# Patient Record
Sex: MALE | Race: WHITE | NOT HISPANIC OR LATINO | Employment: PART TIME | URBAN - METROPOLITAN AREA
[De-identification: names, ages, dates, MRNs, and addresses within clinical notes are randomized per-mention and may not be internally consistent; named-entity substitution may affect disease eponyms.]

---

## 2017-01-19 ENCOUNTER — COMMUNICATION - HEALTHEAST (OUTPATIENT)
Dept: NEUROLOGY | Facility: CLINIC | Age: 22
End: 2017-01-19

## 2017-01-19 DIAGNOSIS — F39 UNSPECIFIED EPISODIC MOOD DISORDER: ICD-10-CM

## 2017-02-27 ENCOUNTER — OFFICE VISIT (OUTPATIENT)
Dept: FAMILY MEDICINE | Facility: CLINIC | Age: 22
End: 2017-02-27
Payer: COMMERCIAL

## 2017-02-27 VITALS
RESPIRATION RATE: 15 BRPM | SYSTOLIC BLOOD PRESSURE: 114 MMHG | TEMPERATURE: 98.2 F | DIASTOLIC BLOOD PRESSURE: 70 MMHG | WEIGHT: 242 LBS | BODY MASS INDEX: 31.07 KG/M2 | HEART RATE: 88 BPM | OXYGEN SATURATION: 91 %

## 2017-02-27 DIAGNOSIS — H66.004 RECURRENT ACUTE SUPPURATIVE OTITIS MEDIA OF RIGHT EAR WITHOUT SPONTANEOUS RUPTURE OF TYMPANIC MEMBRANE: Primary | ICD-10-CM

## 2017-02-27 PROCEDURE — 99213 OFFICE O/P EST LOW 20 MIN: CPT | Performed by: PHYSICIAN ASSISTANT

## 2017-02-27 RX ORDER — OFLOXACIN 3 MG/ML
10 SOLUTION AURICULAR (OTIC) 2 TIMES DAILY
Qty: 10 ML | Refills: 0 | Status: SHIPPED | OUTPATIENT
Start: 2017-02-27 | End: 2017-03-06

## 2017-02-27 NOTE — PROGRESS NOTES
SUBJECTIVE:                                                    Hamilton Hastings is a 21 year old male who presents to clinic today for the following health issues:    Ear infection      Duration: 3 days    Description (location/character/radiation): patient is here today for pain in his right ear, with drainage, no fevers, no other cold sxs    Intensity:  moderate    Accompanying signs and symptoms: see above    History (similar episodes/previous evaluation): None    Precipitating or alleviating factors: None    Therapies tried and outcome: None       HPI additional notes:   Chief Complaint   Patient presents with     Ear Problem     Hamilton presents today with his mother. Patient c/o Rt ear pain x2d. Wears ear buds and ear plugs often; has hx recurrent AOE due to this. Also has hx recurrent AOM, last treated Aug 2016.      ROS:  Skin: negative  Eyes: negative  Ears/Nose/Throat: as above  Respiratory: No shortness of breath, dyspnea on exertion, cough, or hemoptysis  Cardiovascular: negative  Gastrointestinal: negative  Genitourinary: negative  Musculoskeletal: negative  Neurologic: negative  Psychiatric: negative  Hematologic/Lymphatic/Immunologic: negative  Endocrine: negative    Chart Review:  History   Smoking Status     Never Smoker   Smokeless Tobacco     Never Used       Patient Active Problem List   Diagnosis     Autism     Traumatic brain injury (H)     History reviewed. No pertinent past surgical history.  Problem list, Medication list, Allergies, Medical/Social/Surg hx reviewed in [facility], updated as appropriate.   OBJECTIVE:                                                    /70 (BP Location: Left arm, Patient Position: Chair, Cuff Size: Adult Large)  Pulse 88  Temp 98.2  F (36.8  C) (Tympanic)  Resp 15  Wt 242 lb (109.8 kg)  SpO2 91%  BMI 31.07 kg/m2  Body mass index is 31.07 kg/(m^2).  GENERAL: healthy, in no acute distress  EYES: Grossly normal to inspection, no discharge, no injection  HENT: Rt  ear canal edematous, erythematous, yellow discharge; partially visualized TM, appears clear. Lt Ear canal normal; TMs pearly gray without effusion. Oral mucous membranes moist, no lesions or ulcerations. Pharynx pink.  Uvula midline.  NECK: Non-tender, no adenopathy.  RESP: lungs clear to auscultation - no rales, no rhonchi, no wheezes  CV: regular rate and rhythm, normal S1 S2.  No peripheral edema.  SKIN: no suspicious lesions, no rashes  PSYCH: alert, flat affect    Diagnostic test results: none     ASSESSMENT/PLAN:                                                          ICD-10-CM    1. Recurrent acute suppurative otitis media of right ear without spontaneous rupture of tympanic membrane H66.004 ofloxacin (FLOXIN) 0.3 % otic solution     Complete entire course of ear drops as directed, even if sx improve. Do not insert anything into the ear until treatment complete. Avoid submerging ear under water until treatment complete.     Please see patient instructions for treatment details.    Follow up in 7-10 days if not improving as anticipated, sooner PRN.    Melonie Johnson PA-C  [facility]

## 2017-02-27 NOTE — NURSING NOTE
"Chief Complaint   Patient presents with     Ear Problem       Initial /70 (BP Location: Left arm, Patient Position: Chair, Cuff Size: Adult Large)  Pulse 88  Temp 98.2  F (36.8  C) (Tympanic)  Resp 15  Wt 242 lb (109.8 kg)  SpO2 91%  BMI 31.07 kg/m2 Estimated body mass index is 31.07 kg/(m^2) as calculated from the following:    Height as of 8/30/16: 6' 2\" (1.88 m).    Weight as of this encounter: 242 lb (109.8 kg).  Medication Reconciliation: complete    "

## 2017-02-27 NOTE — MR AVS SNAPSHOT
"              After Visit Summary   2017    Hamilton Hastings    MRN: [id number]           Patient Information     Date Of Birth          1995        Visit Information        Provider Department      2017 2:50 PM Melonie Johnson PA-C [facility]        Today's Diagnoses     Recurrent acute suppurative otitis media of right ear without spontaneous rupture of tympanic membrane    -  1       Follow-ups after your visit        Who to contact     If you have questions or need follow up information about today's clinic visit or your schedule please contact [facility] directly at [phone].  Normal or non-critical lab and imaging results will be communicated to you by MyChart, letter or phone within 4 business days after the clinic has received the results. If you do not hear from us within 7 days, please contact the clinic through MyChart or phone. If you have a critical or abnormal lab result, we will notify you by phone as soon as possible.  Submit refill requests through [employer] or call your pharmacy and they will forward the refill request to us. Please allow 3 business days for your refill to be completed.          Additional Information About Your Visit        MyChart Information     [employer] lets you send messages to your doctor, view your test results, renew your prescriptions, schedule appointments and more. To sign up, go to [email or web address] . Click on \"Log in\" on the left side of the screen, which will take you to the Welcome page. Then click on \"Sign up Now\" on the right side of the page.     You will be asked to enter the access code listed below, as well as some personal information. Please follow the directions to create your username and password.     Your access code is: [email or web address]  Expires: 2017  3:02 PM     Your access code will  in 90 days. If you need help or a new code, please call your [place] clinic " or [phone].        Care EveryWhere ID     This is your Care EveryWhere ID. This could be used by other organizations to access your [facility] medical records  [id number]        Your Vitals Were     Pulse Temperature Respirations Pulse Oximetry BMI (Body Mass Index)       88 98.2  F (36.8  C) (Tympanic) 15 91% 31.07 kg/m2        Blood Pressure from Last 3 Encounters:   02/27/17 114/70   08/30/16 100/70   01/11/16 93/58    Weight from Last 3 Encounters:   02/27/17 242 lb (109.8 kg)   08/30/16 248 lb (112.5 kg)   01/11/16 248 lb 14.4 oz (112.9 kg)              Today, you had the following     No orders found for display         Today's Medication Changes          These changes are accurate as of: 2/27/17  3:02 PM.  If you have any questions, ask your nurse or doctor.               Start taking these medicines.        Dose/Directions    ofloxacin 0.3 % otic solution   Commonly known as:  FLOXIN   Used for:  Recurrent acute suppurative otitis media of right ear without spontaneous rupture of tympanic membrane   Started by:  Melonie Johnson PA-C        Dose:  10 drop   Place 10 drops into the right ear 2 times daily for 7 days   Quantity:  10 mL   Refills:  0            Where to get your medicines      These medications were sent to [facility] [address] [place] [address] TRACIE SMITH AT [facility] [address]IM[address]ODGES  [facility] [place] [address]     Phone:  [phone]     ofloxacin 0.3 % otic solution                Primary Care Provider Office Phone # Fax #    Tarik Ortega [phone]       [facility] [address] [facility] [address]        Thank you!     Thank you for choosing [facility]  for your care. Our goal is always to provide you with excellent care. Hearing back from our patients is one way we can continue to improve our services. Please take a few minutes to complete the written survey that you may receive in the  mail after your visit with us. Thank you!             Your Updated Medication List - Protect others around you: Learn how to safely use, store and throw away your medicines at www.disposemymeds.org.          This list is accurate as of: 2/27/17  3:02 PM.  Always use your most recent med list.                   Brand Name Dispense Instructions for use    ABILIFY 20 MG tablet   Generic drug:  ARIPiprazole      Take 0.5 tablets (10 mg) by mouth 2 times daily       ALPHA LIPOIC ACID PO          buPROPion 100 MG 12 hr tablet    WELLBUTRIN SR    60 tablet    Take 1 tablet (100 mg) by mouth daily       cetirizine 10 MG tablet    zyrTEC     Take 10 mg by mouth daily Reported on 2/27/2017       FISH OIL PO          FLUoxetine 10 MG capsule    PROzac    90 capsule    Take 2 capsules (20 mg) by mouth 2 times daily       magnesium 250 MG tablet     30 tablet    Take 1 tablet by mouth daily       MELATONIN PO      Take 3 mg by mouth       multivitamin, therapeutic Tabs tablet      Take 1 tablet by mouth daily Reported on 2/27/2017       ofloxacin 0.3 % otic solution    FLOXIN    10 mL    Place 10 drops into the right ear 2 times daily for 7 days

## 2017-03-07 ENCOUNTER — HOSPITAL ENCOUNTER (OUTPATIENT)
Dept: NEUROLOGY | Facility: CLINIC | Age: 22
Setting detail: THERAPIES SERIES
Discharge: STILL ADMITTED OR ADMINISTRATIVE | End: 2017-03-07
Attending: NURSE PRACTITIONER

## 2017-03-07 DIAGNOSIS — F32.A DEPRESSION: ICD-10-CM

## 2017-03-09 ENCOUNTER — OFFICE VISIT (OUTPATIENT)
Dept: FAMILY MEDICINE | Facility: CLINIC | Age: 22
End: 2017-03-09
Payer: COMMERCIAL

## 2017-03-09 VITALS
OXYGEN SATURATION: 98 % | HEIGHT: 74 IN | DIASTOLIC BLOOD PRESSURE: 78 MMHG | HEART RATE: 74 BPM | BODY MASS INDEX: 30.93 KG/M2 | SYSTOLIC BLOOD PRESSURE: 118 MMHG | TEMPERATURE: 97.7 F | RESPIRATION RATE: 22 BRPM | WEIGHT: 241 LBS

## 2017-03-09 DIAGNOSIS — S06.9X0D TRAUMATIC BRAIN INJURY, WITHOUT LOSS OF CONSCIOUSNESS, SUBSEQUENT ENCOUNTER: ICD-10-CM

## 2017-03-09 DIAGNOSIS — F84.0 AUTISM: ICD-10-CM

## 2017-03-09 DIAGNOSIS — Z11.1 SCREENING EXAMINATION FOR PULMONARY TUBERCULOSIS: ICD-10-CM

## 2017-03-09 DIAGNOSIS — Z00.00 ROUTINE GENERAL MEDICAL EXAMINATION AT A HEALTH CARE FACILITY: Primary | ICD-10-CM

## 2017-03-09 LAB
ALBUMIN UR-MCNC: NEGATIVE MG/DL
APPEARANCE UR: CLEAR
BASOPHILS # BLD AUTO: 0.1 10E9/L (ref 0–0.2)
BASOPHILS NFR BLD AUTO: 0.6 %
BILIRUB UR QL STRIP: NEGATIVE
COLOR UR AUTO: YELLOW
DIFFERENTIAL METHOD BLD: NORMAL
EOSINOPHIL # BLD AUTO: 0.1 10E9/L (ref 0–0.7)
EOSINOPHIL NFR BLD AUTO: 1.8 %
ERYTHROCYTE [DISTWIDTH] IN BLOOD BY AUTOMATED COUNT: 12.7 % (ref 10–15)
GLUCOSE UR STRIP-MCNC: NEGATIVE MG/DL
HCT VFR BLD AUTO: 46.5 % (ref 40–53)
HGB BLD-MCNC: 15.9 G/DL (ref 13.3–17.7)
HGB UR QL STRIP: NEGATIVE
KETONES UR STRIP-MCNC: NEGATIVE MG/DL
LEUKOCYTE ESTERASE UR QL STRIP: NEGATIVE
LYMPHOCYTES # BLD AUTO: 2.8 10E9/L (ref 0.8–5.3)
LYMPHOCYTES NFR BLD AUTO: 35.9 %
MCH RBC QN AUTO: 30.3 PG (ref 26.5–33)
MCHC RBC AUTO-ENTMCNC: 34.2 G/DL (ref 31.5–36.5)
MCV RBC AUTO: 89 FL (ref 78–100)
MONOCYTES # BLD AUTO: 0.6 10E9/L (ref 0–1.3)
MONOCYTES NFR BLD AUTO: 7.5 %
NEUTROPHILS # BLD AUTO: 4.2 10E9/L (ref 1.6–8.3)
NEUTROPHILS NFR BLD AUTO: 54.2 %
NITRATE UR QL: NEGATIVE
PH UR STRIP: 7 PH (ref 5–7)
PLATELET # BLD AUTO: 217 10E9/L (ref 150–450)
RBC # BLD AUTO: 5.24 10E12/L (ref 4.4–5.9)
SP GR UR STRIP: 1.02 (ref 1–1.03)
URN SPEC COLLECT METH UR: NORMAL
UROBILINOGEN UR STRIP-ACNC: 0.2 EU/DL (ref 0.2–1)
WBC # BLD AUTO: 7.8 10E9/L (ref 4–11)

## 2017-03-09 PROCEDURE — 81003 URINALYSIS AUTO W/O SCOPE: CPT | Performed by: FAMILY MEDICINE

## 2017-03-09 PROCEDURE — 99395 PREV VISIT EST AGE 18-39: CPT | Performed by: FAMILY MEDICINE

## 2017-03-09 PROCEDURE — 85025 COMPLETE CBC W/AUTO DIFF WBC: CPT | Performed by: FAMILY MEDICINE

## 2017-03-09 PROCEDURE — 36415 COLL VENOUS BLD VENIPUNCTURE: CPT | Performed by: FAMILY MEDICINE

## 2017-03-09 PROCEDURE — 86580 TB INTRADERMAL TEST: CPT | Performed by: FAMILY MEDICINE

## 2017-03-09 NOTE — LETTER
[facility]  [address]  [address]  [place] [place] [address]  [phone]                                                                                                           Hamilton Hastings  [address] [place] [address]    March 10, 2017      Dear Hamilton,    The results of your recent tests were reviewed and are enclosed.     Results for orders placed or performed in visit on 03/09/17   UA reflex to Microscopic and Culture   Result Value Ref Range    Color Urine Yellow     Appearance Urine Clear     Glucose Urine Negative NEG mg/dL    Bilirubin Urine Negative NEG    Ketones Urine Negative NEG mg/dL    Specific Gravity Urine 1.020 1.003 - 1.035    Blood Urine Negative NEG    pH Urine 7.0 5.0 - 7.0 pH    Protein Albumin Urine Negative NEG mg/dL    Urobilinogen Urine 0.2 0.2 - 1.0 EU/dL    Nitrite Urine Negative NEG    Leukocyte Esterase Urine Negative NEG    Source Midstream Urine    CBC with platelets differential   Result Value Ref Range    WBC 7.8 4.0 - 11.0 10e9/L    RBC Count 5.24 4.4 - 5.9 10e12/L    Hemoglobin 15.9 13.3 - 17.7 g/dL    Hematocrit 46.5 40.0 - 53.0 %    MCV 89 78 - 100 fl    MCH 30.3 26.5 - 33.0 pg    MCHC 34.2 31.5 - 36.5 g/dL    RDW 12.7 10.0 - 15.0 %    Platelet Count 217 150 - 450 10e9/L    Diff Method Automated Method     % Neutrophils 54.2 %    % Lymphocytes 35.9 %    % Monocytes 7.5 %    % Eosinophils 1.8 %    % Basophils 0.6 %    Absolute Neutrophil 4.2 1.6 - 8.3 10e9/L    Absolute Lymphocytes 2.8 0.8 - 5.3 10e9/L    Absolute Monocytes 0.6 0.0 - 1.3 10e9/L    Absolute Eosinophils 0.1 0.0 - 0.7 10e9/L    Absolute Basophils 0.1 0.0 - 0.2 10e9/L         Thank you for choosing [facility].  We appreciate the opportunity to serve you and look forward to supporting your healthcare needs in the future.    If you have any questions or concerns, please call me or my staff at [phone].      Sincerely,    Ayesha  MD Susan

## 2017-03-09 NOTE — PROGRESS NOTES
SUBJECTIVE:     CC: Hamilton Hastings is an 21 year old male who presents for preventative health visit.     Healthy Habits:    Do you get at least three servings of calcium containing foods daily (dairy, green leafy vegetables, etc.)? yes    Amount of exercise or daily activities, outside of work: 3 day(s) per week    Problems taking medications regularly No    Medication side effects: No    Have you had an eye exam in the past two years? yes    Do you see a dentist twice per year? yes  Do you have sleep apnea, excessive snoring or daytime drowsiness?no        Today's PHQ-2 Score:   PHQ-2 ( 1999 [employer]) 1/11/2016 10/16/2015   Q1: Little interest or pleasure in doing things 0 0   Q2: Feeling down, depressed or hopeless 0 0   PHQ-2 Score 0 0       Abuse: Current or Past(Physical, Sexual or Emotional)- No  Do you feel safe in your environment - Yes    Social History   Substance Use Topics     Smoking status: Never Smoker     Smokeless tobacco: Never Used     Alcohol use No     The patient does not drink >3 drinks per day nor >7 drinks per week.    Last PSA: No results found for: PSA    No results for input(s): CHOL, HDL, LDL, TRIG, CHOLHDLRATIO, NHDL in the last 90455 hours.    Reviewed orders with patient. Reviewed health maintenance and updated orders accordingly - Yes    Reviewed and updated as needed this visit by clinical staff  Allergies  Meds         Reviewed and updated as needed this visit by Provider            ROS:  C: NEGATIVE for fever, chills, change in weight  I: NEGATIVE for worrisome rashes, moles or lesions  E: NEGATIVE for vision changes or irritation  ENT: NEGATIVE for ear, mouth and throat problems  R: NEGATIVE for significant cough or SOB  CV: NEGATIVE for chest pain, palpitations or peripheral edema  GI: NEGATIVE for nausea, abdominal pain, heartburn, or change in bowel habits   male: negative for dysuria, hematuria, decreased urinary stream, erectile dysfunction, urethral discharge  M:  NEGATIVE for significant arthralgias or myalgia  N: NEGATIVE for weakness, dizziness or paresthesias  PSYCHIATRIC: POSITIVE for, agitation, anxiety, concentration difficulty, depressed mood, HX depression, impaired memory, obsessive thoughts, psychomotor agitation and weight gain    Problem list, Medication list, Allergies, and Medical/Social/Surgical histories reviewed in [facility] and updated as appropriate.  Labs reviewed in EPIC  OBJECTIVE:     There were no vitals taken for this visit.  EXAM:  GENERAL: healthy, alert and no distress  EYES: Eyes grossly normal to inspection, PERRL and conjunctivae and sclerae normal  HENT: normal cephalic/atraumatic, right ear: thickened TM, left ear: normal: no effusions, no erythema, normal landmarks, nose and mouth without ulcers or lesions, oropharynx clear and oral mucous membranes moist  NECK: no adenopathy, no asymmetry, masses, or scars and thyroid normal to palpation  RESP: lungs clear to auscultation - no rales, rhonchi or wheezes  BREAST: normal without masses, tenderness or nipple discharge and no palpable axillary masses or adenopathy  CV: regular rate and rhythm, normal S1 S2, no S3 or S4, no murmur, click or rub, no peripheral edema and peripheral pulses strong  ABDOMEN: soft, nontender, no hepatosplenomegaly, no masses and bowel sounds normal  MS: no gross musculoskeletal defects noted, no edema  SKIN: no suspicious lesions or rashes  NEURO: Normal strength and tone, mentation intact and speech normal  PSYCH: mentation appears normal, affect normal/bright  LYMPH: no cervical, supraclavicular, axillary, or inguinal adenopathy    ASSESSMENT/PLAN:         ICD-10-CM    1. Routine general medical examination at a health care facility for entry to [facility] living home  Z00.00 UA reflex to Microscopic and Culture     CBC with platelets differential   2. Traumatic brain injury, without loss of consciousness, subsequent encounter S06.9X0D    3. Autism F84.0    4. Screening  examination for pulmonary tuberculosis Z11.1 TB INTRADERMAL TEST       COUNSELING:  Reviewed preventive health counseling, as reflected in patient instructions       Regular exercise       Healthy diet/nutrition    Patient Instructions     Preventive Health Recommendations  Male Ages 18 - 25     Yearly exam:             See your health care provider every year in order to  o   Review health changes.   o   Discuss preventive care.    o   Review your medicines if your doctor has prescribed any.    You should be tested each year for STDs (sexually transmitted diseases).     Talk to your provider about cholesterol testing.      If you are at risk for diabetes, you should have a diabetes test (fasting glucose).    Shots: Get a flu shot each year. Get a tetanus shot every 10 years.     Nutrition:    Eat at least 5 servings of fruits and vegetables daily.     Eat whole-grain bread, whole-wheat pasta and brown rice instead of white grains and rice.     Talk to your provider about calcium and Vitamin D.     Lifestyle    Exercise for at least 150 minutes a week (30 minutes a day, 5 days a week). This will help you control your weight and prevent disease.     Limit alcohol to one drink per day.     No smoking.     Wear sunscreen to prevent skin cancer.     See your dentist every six months for an exam and cleaning.      1/  Weight Loss Tips  1. Do not eat after 6 hrs before your expected bedtime  2. Have your heaviest meal for breakfast, a slightly lighter meal at lunch and a snack 6 hrs before bed  3. No sugar/calorie drinks except milk ie no fruit juice, pop, alcohol.  4. Drink milk 30min before meals to decrease your hunger. Also it is excellent as part of your last meal of the day snack  5. Drink lots of water  6. Increase fiber in diet: all bran cereal, salads, popcorn etc  7. Have only one small serving of fruit a day about 1/2 cup (as this is high in sugar)  8. EXERCISE is the bottom line. Without it, you will gain  "weight even on a low calorie diet. Best if done 2-3X a day as can      Being overweight contributes to high blood pressure and high cholesterol, both of which cause heart attacks, strokes and kidney failure, prediabetes and diabetes, arthritis, and liver disease     Return in 5.5 mo for a PHQ-9        Sooner as required             reports that he has never smoked. He has never used smokeless tobacco.    Estimated body mass index is 31.07 kg/(m^2) as calculated from the following:    Height as of 8/30/16: 6' 2\" (1.88 m).    Weight as of 2/27/17: 242 lb (109.8 kg).   Weight management plan: Discussed healthy diet and exercise guidelines and patient will follow up in 6 months in clinic to re-evaluate.    Counseling Resources:  ATP IV Guidelines  Pooled Cohorts Equation Calculator  FRAX Risk Assessment  ICSI Preventive Guidelines  Dietary Guidelines for Americans, 2010  USDA's MyPlate  ASA Prophylaxis  Lung CA Screening    Weight management plan: Discussed healthy diet and exercise guidelines and patient will follow up in 6 months in clinic to re-evaluate.    Ayesha Davis MD  [facility]  "

## 2017-03-09 NOTE — NURSING NOTE
"Chief Complaint   Patient presents with     Physical     There were no vitals taken for this visit. Estimated body mass index is 31.07 kg/(m^2) as calculated from the following:    Height as of 8/30/16: 6' 2\" (1.88 m).    Weight as of 2/27/17: 242 lb (109.8 kg).  BP completed using cuff size: cinthya Fowler CMA    Health Maintenance Due   Topic Date Due     HPV IMMUNIZATION (1 of 3 - Male 3 Dose Series) 05/10/2006     PEDS DTAP/TDAP (2 - Td) 04/29/2012     Health Maintenance reviewed at today's visit patient asked to schedule/complete:   Immunizations:  Patient agrees to schedule    "

## 2017-03-09 NOTE — LETTER
[facility]  [address]  [address]  [place] [address]  [phone]                                                                                                           Hamilton Hastings  [address] [address]    March 13, 2017      Dear Hamilton,    The results of your recent tests were reviewed and are enclosed.     Negative Mantoux test.  No evidence for exposure to TB    Results for orders placed or performed in visit on 03/09/17   TB INTRADERMAL TEST   Result Value Ref Range    PPD Induration 0 0 - 5 mm    PPD Redness 0 mm    Impression    Mantoux results NEGATIVE. No induration.  No swelling.  No redness.   UA reflex to Microscopic and Culture   Result Value Ref Range    Color Urine Yellow     Appearance Urine Clear     Glucose Urine Negative NEG mg/dL    Bilirubin Urine Negative NEG    Ketones Urine Negative NEG mg/dL    Specific Gravity Urine 1.020 1.003 - 1.035    Blood Urine Negative NEG    pH Urine 7.0 5.0 - 7.0 pH    Protein Albumin Urine Negative NEG mg/dL    Urobilinogen Urine 0.2 0.2 - 1.0 EU/dL    Nitrite Urine Negative NEG    Leukocyte Esterase Urine Negative NEG    Source Midstream Urine    CBC with platelets differential   Result Value Ref Range    WBC 7.8 4.0 - 11.0 10e9/L    RBC Count 5.24 4.4 - 5.9 10e12/L    Hemoglobin 15.9 13.3 - 17.7 g/dL    Hematocrit 46.5 40.0 - 53.0 %    MCV 89 78 - 100 fl    MCH 30.3 26.5 - 33.0 pg    MCHC 34.2 31.5 - 36.5 g/dL    RDW 12.7 10.0 - 15.0 %    Platelet Count 217 150 - 450 10e9/L    Diff Method Automated Method     % Neutrophils 54.2 %    % Lymphocytes 35.9 %    % Monocytes 7.5 %    % Eosinophils 1.8 %    % Basophils 0.6 %    Absolute Neutrophil 4.2 1.6 - 8.3 10e9/L    Absolute Lymphocytes 2.8 0.8 - 5.3 10e9/L    Absolute Monocytes 0.6 0.0 - 1.3 10e9/L    Absolute Eosinophils 0.1 0.0 - 0.7 10e9/L    Absolute Basophils 0.1 0.0 - 0.2 10e9/L         Thank you for choosing [facility]  [facility].  We appreciate the opportunity to serve you and look forward to supporting your healthcare needs in the future.    If you have any questions or concerns, please call me or my staff at [phone].      Sincerely,    Garrett Jama MD

## 2017-03-09 NOTE — LETTER
My Depression Action Plan  Name: Hamilton Hastings   Date of Birth 1995  Date: 3/9/2017    My doctor: Ayesha Davis   My clinic: [address] [address]  [phone]          GREEN    ZONE   Good Control    What it looks like:     Things are going generally well. You have normal up s and down s. You may even feel depressed from time to time, but bad moods usually last less than a day.   What you need to do:  1. Continue to care for yourself (see self care plan)  2. Check your depression survival kit and update it as needed  3. Follow your physician s recommendations including any medication.  4. Do not stop taking medication unless you consult with your physician first.           YELLOW         ZONE Getting Worse    What it looks like:     Depression is starting to interfere with your life.     It may be hard to get out of bed; you may be starting to isolate yourself from others.    Symptoms of depression are starting to last most all day and this has happened for several days.     You may have suicidal thoughts but they are not constant.   What you need to do:     1. Call your care team, your response to treatment will improve if you keep your care team informed of your progress. Yellow periods are signs an adjustment may need to be made.     2. Continue your self-care, even if you have to fake it!    3. Talk to someone in your support network    4. Open up your depression survival kit           RED    ZONE Medical Alert - Get Help    What it looks like:     Depression is seriously interfering with your life.     You may experience these or other symptoms: You can t get out of bed most days, can t work or engage in other necessary activities, you have trouble taking care of basic hygiene, or basic responsibilities, thoughts of suicide or death that will not go away, self-injurious behavior.     What  you need to do:  1. Call your care team and request a same-day appointment. If they are not available (weekends or after hours) call your local crisis line, emergency room or 911.      Electronically signed by: Ayesha Davis, March 9, 2017    Depression Self Care Plan / Survival Kit    Self-Care for Depression  Here s the deal. Your body and mind are really not as separate as most people think.  What you do and think affects how you feel and how you feel influences what you do and think. This means if you do things that people who feel good do, it will help you feel better.  Sometimes this is all it takes.  There is also a place for medication and therapy depending on how severe your depression is, so be sure to consult with your medical provider and/ or Behavioral Health Consultant if your symptoms are worsening or not improving.     In order to better manage my stress, I will:    Exercise  Get some form of exercise, every day. This will help reduce pain and release endorphins, the  feel good  chemicals in your brain. This is almost as good as taking antidepressants!  This is not the same as joining a gym and then never going! (they count on that by the way ) It can be as simple as just going for a walk or doing some gardening, anything that will get you moving.      Hygiene   Maintain good hygiene (Get out of bed in the morning, Make your bed, Brush your teeth, Take a shower, and Get dressed like you were going to work, even if you are unemployed).  If your clothes don't fit try to get ones that do.    Diet  I will strive to eat foods that are good for me, drink plenty of water, and avoid excessive sugar, caffeine, alcohol, and other mood-altering substances.  Some foods that are helpful in depression are: complex carbohydrates, B vitamins, flaxseed, fish or fish oil, fresh fruits and vegetables.    Psychotherapy  I agree to participate in Individual Therapy (if recommended).    Medication  If prescribed  medications, I agree to take them.  Missing doses can result in serious side effects.  I understand that drinking alcohol, or other illicit drug use, may cause potential side effects.  I will not stop my medication abruptly without first discussing it with my provider.    Staying Connected With Others  I will stay in touch with my friends, family members, and my primary care provider/team.    Use your imagination  Be creative.  We all have a creative side; it doesn t matter if it s oil painting, sand castles, or mud pies! This will also kick up the endorphins.    Witness Beauty  (AKA stop and smell the roses) Take a look outside, even in mid-winter. Notice colors, textures. Watch the squirrels and birds.     Service to others  Be of service to others.  There is always someone else in need.  By helping others we can  get out of ourselves  and remember the really important things.  This also provides opportunities for practicing all the other parts of the program.    Humor  Laugh and be silly!  Adjust your TV habits for less news and crime-drama and more comedy.    Control your stress  Try breathing deep, massage therapy, biofeedback, and meditation. Find time to relax each day.     My support system    Clinic Contact:  Phone number:    Contact 1:  Phone number:    Contact 2:  Phone number:    Confucianist/:  Phone number:    Therapist:  Phone number:    [facility] crisis center:    Phone number:    Other community support:  Phone number:

## 2017-03-09 NOTE — PATIENT INSTRUCTIONS
Preventive Health Recommendations  Male Ages 18 - 25     Yearly exam:             See your health care provider every year in order to  o   Review health changes.   o   Discuss preventive care.    o   Review your medicines if your doctor has prescribed any.    You should be tested each year for STDs (sexually transmitted diseases).     Talk to your provider about cholesterol testing.      If you are at risk for diabetes, you should have a diabetes test (fasting glucose).    Shots: Get a flu shot each year. Get a tetanus shot every 10 years.     Nutrition:    Eat at least 5 servings of fruits and vegetables daily.     Eat whole-grain bread, whole-wheat pasta and brown rice instead of white grains and rice.     Talk to your provider about calcium and Vitamin D.     Lifestyle    Exercise for at least 150 minutes a week (30 minutes a day, 5 days a week). This will help you control your weight and prevent disease.     Limit alcohol to one drink per day.     No smoking.     Wear sunscreen to prevent skin cancer.     See your dentist every six months for an exam and cleaning.      1/  Weight Loss Tips  1. Do not eat after 6 hrs before your expected bedtime  2. Have your heaviest meal for breakfast, a slightly lighter meal at lunch and a snack 6 hrs before bed  3. No sugar/calorie drinks except milk ie no fruit juice, pop, alcohol.  4. Drink milk 30min before meals to decrease your hunger. Also it is excellent as part of your last meal of the day snack  5. Drink lots of water  6. Increase fiber in diet: all bran cereal, salads, popcorn etc  7. Have only one small serving of fruit a day about 1/2 cup (as this is high in sugar)  8. EXERCISE is the bottom line. Without it, you will gain weight even on a low calorie diet. Best if done 2-3X a day as can      Being overweight contributes to high blood pressure and high cholesterol, both of which cause heart attacks, strokes and kidney failure, prediabetes and diabetes,  arthritis, and liver disease     Return in 5.5 mo for a PHQ-9        Sooner as required      NEEDS TO RETURN FOR THE PPD ON 3-11-17   Open 8-12N -- Need appt ###    As  Needs the PE form filled in re this

## 2017-03-09 NOTE — MR AVS SNAPSHOT
After Visit Summary   3/9/2017    Hamilton Hastings    MRN: [id number]           Patient Information     Date Of Birth          1995        Visit Information        Provider Department      3/9/2017 2:20 PM Ayesha Davis MD [facility]        Today's Diagnoses     Routine general medical examination at a health care facility    -  1    Screening examination for pulmonary tuberculosis        Traumatic brain injury, without loss of consciousness, subsequent encounter          Care Instructions      Preventive Health Recommendations  Male Ages 18 - 25     Yearly exam:             See your health care provider every year in order to  o   Review health changes.   o   Discuss preventive care.    o   Review your medicines if your doctor has prescribed any.    You should be tested each year for STDs (sexually transmitted diseases).     Talk to your provider about cholesterol testing.      If you are at risk for diabetes, you should have a diabetes test (fasting glucose).    Shots: Get a flu shot each year. Get a tetanus shot every 10 years.     Nutrition:    Eat at least 5 servings of fruits and vegetables daily.     Eat whole-grain bread, whole-wheat pasta and brown rice instead of white grains and rice.     Talk to your provider about calcium and Vitamin D.     Lifestyle    Exercise for at least 150 minutes a week (30 minutes a day, 5 days a week). This will help you control your weight and prevent disease.     Limit alcohol to one drink per day.     No smoking.     Wear sunscreen to prevent skin cancer.     See your dentist every six months for an exam and cleaning.      1/  Weight Loss Tips  1. Do not eat after 6 hrs before your expected bedtime  2. Have your heaviest meal for breakfast, a slightly lighter meal at lunch and a snack 6 hrs before bed  3. No sugar/calorie drinks except milk ie no fruit juice, pop, alcohol.  4. Drink milk 30min before meals to  decrease your hunger. Also it is excellent as part of your last meal of the day snack  5. Drink lots of water  6. Increase fiber in diet: all bran cereal, salads, popcorn etc  7. Have only one small serving of fruit a day about 1/2 cup (as this is high in sugar)  8. EXERCISE is the bottom line. Without it, you will gain weight even on a low calorie diet. Best if done 2-3X a day as can      Being overweight contributes to high blood pressure and high cholesterol, both of which cause heart attacks, strokes and kidney failure, prediabetes and diabetes, arthritis, and liver disease     Return in 5.5 mo for a PHQ-9        Sooner as required      NEEDS TO RETURN FOR THE PPD ON 3-11-17   Open 8-12N -- Need appt ###          Follow-ups after your visit        Follow-up notes from your care team     Return in about 6 months (around 9/9/2017) for Routine Visit.      Your next 10 appointments already scheduled     Mar 11, 2017 11:15 AM CST   Nurse Only with BX NURSE   [facility]    [address] [place] [id number] [phone]              Who to contact     If you have questions or need follow up information about today's clinic visit or your schedule please contact [facility] directly at [phone].  Normal or non-critical lab and imaging results will be communicated to you by MyChart, letter or phone within 4 business days after the clinic has received the results. If you do not hear from us within 7 days, please contact the clinic through BufferBox[employer] or phone. If you have a critical or abnormal lab result, we will notify you by phone as soon as possible.  Submit refill requests through [employer] or call your pharmacy and they will forward the refill request to us. Please allow 3 business days for your refill to be completed.          Additional Information About Your Visit        [employer]  "Information     Me[employer] Ticket lets you send messages to your doctor, view your test results, renew your prescriptions, schedule appointments and more. To sign up, go to [email or web address] . Click on \"Log in\" on the left side of the screen, which will take you to the Welcome page. Then click on \"Sign up Now\" on the right side of the page.     You will be asked to enter the access code listed below, as well as some personal information. Please follow the directions to create your username and password.     Your access code is: [email or web address]  Expires: 2017  3:02 PM     Your access code will  in 90 days. If you need help or a new code, please call your [place] clinic or [phone].        Care EveryWhere ID     This is your Care EveryWhere ID. This could be used by other organizations to access your [place] medical records  [id number]        Your Vitals Were     Pulse Temperature Respirations Height Pulse Oximetry BMI (Body Mass Index)    74 97.7  F (36.5  C) (Oral) 22 6' 2\" (1.88 m) 98% 30.94 kg/m2       Blood Pressure from Last 3 Encounters:   17 118/78   17 114/70   16 100/70    Weight from Last 3 Encounters:   17 241 lb (109.3 kg)   17 242 lb (109.8 kg)   16 248 lb (112.5 kg)              We Performed the Following     CBC with platelets differential     DEPRESSION ACTION PLAN (DAP)     TB INTRADERMAL TEST     UA reflex to Microscopic and Culture        Primary Care Provider Office Phone # Fax #    Ayesha Davis [phone]       [facility] [address]  [facility] [address]        Thank you!     [facility]. Our goal is always to provide you with excellent care. Hearing back from our patients is one way we can continue to improve our services. Please take a few minutes to complete the written survey that you may receive in the mail after your visit " with us. Thank you!             Your Updated Medication List - Protect others around you: Learn how to safely use, store and throw away your medicines at www.disposemymeds.org.          This list is accurate as of: 3/9/17  4:20 PM.  Always use your most recent med list.                   Brand Name Dispense Instructions for use    ABILIFY 20 MG tablet   Generic drug:  ARIPiprazole      Take 0.5 tablets (10 mg) by mouth 2 times daily       ALPHA LIPOIC ACID PO          buPROPion 100 MG 12 hr tablet    WELLBUTRIN SR    60 tablet    Take 1 tablet (100 mg) by mouth daily       FISH OIL PO          FLUoxetine 10 MG capsule    PROzac    90 capsule    Take 2 capsules (20 mg) by mouth 2 times daily       magnesium 250 MG tablet     30 tablet    Take 1 tablet by mouth daily       MELATONIN PO      Take 3 mg by mouth       multivitamin, therapeutic Tabs tablet      Take 1 tablet by mouth daily Reported on 2/27/2017

## 2017-03-10 ASSESSMENT — PATIENT HEALTH QUESTIONNAIRE - PHQ9: SUM OF ALL RESPONSES TO PHQ QUESTIONS 1-9: 5

## 2017-03-11 ENCOUNTER — ALLIED HEALTH/NURSE VISIT (OUTPATIENT)
Dept: NURSING | Facility: CLINIC | Age: 22
End: 2017-03-11
Payer: COMMERCIAL

## 2017-03-11 DIAGNOSIS — Z11.1 SCREENING EXAMINATION FOR PULMONARY TUBERCULOSIS: Primary | ICD-10-CM

## 2017-03-11 LAB
PPDINDURATION: 0 MM (ref 0–5)
PPDREDNESS: 0 MM

## 2017-03-11 PROCEDURE — 99207 ZZC NO CHARGE LOS: CPT

## 2017-03-17 ENCOUNTER — COMMUNICATION - HEALTHEAST (OUTPATIENT)
Dept: NEUROLOGY | Facility: CLINIC | Age: 22
End: 2017-03-17

## 2017-03-17 DIAGNOSIS — F06.30 MOOD DISORDER IN CONDITIONS CLASSIFIED ELSEWHERE: ICD-10-CM

## 2017-04-11 ENCOUNTER — TELEPHONE (OUTPATIENT)
Dept: FAMILY MEDICINE | Facility: CLINIC | Age: 22
End: 2017-04-11

## 2017-04-11 NOTE — TELEPHONE ENCOUNTER
Reason for Call:  Form, our goal is to have forms completed with 72 hours, however, some forms may require a visit or additional information.    Type of letter, form or note:  medical     Who is the form from?: [facility]    Where did the form come from: form was faxed in    What clinic location was the form placed at?: [facility]    Where the form was placed: Dr's Box: Ayesha Davis MD    What number is listed as a contact on the form?: Fax # [phone]       Additional comments: Physician's Annual Orders     Call taken on 4/11/2017 at 4:25 PM by Alli Frey

## 2017-05-12 ENCOUNTER — COMMUNICATION - HEALTHEAST (OUTPATIENT)
Dept: NEUROLOGY | Facility: CLINIC | Age: 22
End: 2017-05-12

## 2017-05-12 DIAGNOSIS — E63.9 NUTRITIONAL DEFICIENCY: Primary | ICD-10-CM

## 2017-05-12 RX ORDER — CHLORAL HYDRATE 500 MG
1 CAPSULE ORAL DAILY
Qty: 90 CAPSULE | Refills: 3 | Status: SHIPPED | OUTPATIENT
Start: 2017-05-12 | End: 2021-12-08

## 2017-05-12 RX ORDER — MULTIVITAMIN,THERAPEUTIC
1 TABLET ORAL DAILY
Qty: 90 TABLET | Refills: 3 | OUTPATIENT
Start: 2017-05-12

## 2017-05-12 RX ORDER — SAW/PYGEUM/BETA/HERB/D3/B6/ZN 30 MG-25MG
1 CAPSULE ORAL DAILY
Qty: 90 CAPSULE | Refills: 3 | OUTPATIENT
Start: 2017-05-12

## 2017-05-12 RX ORDER — LACTOBACILLUS RHAMNOSUS GG 10B CELL
1 CAPSULE ORAL DAILY
Qty: 90 CAPSULE | Refills: 3 | Status: SHIPPED | OUTPATIENT
Start: 2017-05-12

## 2017-05-12 NOTE — TELEPHONE ENCOUNTER
Soraida, patient's mother is calling to request the following be sent to his group home so that his records can be up to date and the supplements/medications be billed to his insurance.           1) An order for a: High protein low carb shake or bar in the morning with his breakfast and mid afternoon around 1500.  Similar to the brand premier protein shake or high protein low carb bar    2) Following medications be added to his medication list:  multivitamin, magnesium 400 mg, chromium, alpha lopeic acid, vitamin D (seasonal affective disorder), fish oil, probiotic (culterelle)     Left message with Gilberto ( at group home) phone number [phone], need to know the fax number to send the orders to, or what they need from us specifically.    Sandrine Davis RN  05/12/17  9:43 AM

## 2017-05-12 NOTE — TELEPHONE ENCOUNTER
Mother calling back and stating that it is Alicia (RN) that we should call for these requests. Call placed to Alicia to give us a call back so we can know what is needed from us in order for her to give Hamilton the below supplements and protein shakes.   Sandrien Davis RN  05/12/17  9:50 AM        Fax # [phone] melanie Vargas RN phone# [phone]  Fax # [phone] melanie Siegel ()

## 2017-05-12 NOTE — TELEPHONE ENCOUNTER
Orders pended EXCEPT Vitamin D, writer deferring to provider for strength and qty.  Routing to provider for approval of medications  Sandrine Davis RN  05/12/17  10:20 AM

## 2017-05-12 NOTE — TELEPHONE ENCOUNTER
RFs x 2 done as per am     Did not under stand he wanted vit D --as he has never had a level done, will give 1000 IU a day

## 2017-05-12 NOTE — TELEPHONE ENCOUNTER
Alicia called back and said that prescriptions are needed for the things listed below. And wants them faxed to [facility] pharmacy.   Writer will work on this.

## 2017-06-13 ENCOUNTER — HOSPITAL ENCOUNTER (OUTPATIENT)
Dept: NEUROLOGY | Facility: CLINIC | Age: 22
Setting detail: THERAPIES SERIES
Discharge: STILL ADMITTED OR ADMINISTRATIVE | End: 2017-06-13
Attending: NURSE PRACTITIONER

## 2017-06-13 DIAGNOSIS — F06.30 MOOD DISORDER IN CONDITIONS CLASSIFIED ELSEWHERE: ICD-10-CM

## 2017-10-10 ENCOUNTER — HOSPITAL ENCOUNTER (OUTPATIENT)
Dept: NEUROLOGY | Facility: CLINIC | Age: 22
Setting detail: THERAPIES SERIES
Discharge: STILL ADMITTED OR ADMINISTRATIVE | End: 2017-10-10
Attending: NURSE PRACTITIONER

## 2017-10-10 DIAGNOSIS — F06.30 MOOD DISORDER IN CONDITIONS CLASSIFIED ELSEWHERE: ICD-10-CM

## 2018-01-05 ENCOUNTER — TELEPHONE (OUTPATIENT)
Dept: FAMILY MEDICINE | Facility: CLINIC | Age: 23
End: 2018-01-05

## 2018-01-08 NOTE — TELEPHONE ENCOUNTER
Reason for Call:  Form, our goal is to have forms completed with 72 hours, however, some forms may require a visit or additional information.    Type of letter, form or note:  medical    Who is the form from?: Home care    Where did the form come from: form was faxed in    What clinic location was the form placed at?: [facility]    Where the form was placed: 's Box: Ayesha Davis MD    What number is listed as a contact on the form?: [phone]  PHONE [phone]       Additional comments: Hammer medication and diet review     Call taken on 1/8/2018 at 4:22 PM by Serene Bergman

## 2018-03-16 ENCOUNTER — OFFICE VISIT (OUTPATIENT)
Dept: FAMILY MEDICINE | Facility: CLINIC | Age: 23
End: 2018-03-16
Payer: COMMERCIAL

## 2018-03-16 VITALS
RESPIRATION RATE: 22 BRPM | DIASTOLIC BLOOD PRESSURE: 80 MMHG | HEART RATE: 89 BPM | SYSTOLIC BLOOD PRESSURE: 120 MMHG | OXYGEN SATURATION: 98 % | WEIGHT: 210 LBS | BODY MASS INDEX: 26.95 KG/M2 | HEIGHT: 74 IN | TEMPERATURE: 98.2 F

## 2018-03-16 DIAGNOSIS — Z00.00 ROUTINE HISTORY AND PHYSICAL EXAMINATION OF ADULT: Primary | ICD-10-CM

## 2018-03-16 DIAGNOSIS — F84.0 AUTISM: ICD-10-CM

## 2018-03-16 DIAGNOSIS — E66.3 OVERWEIGHT (BMI 25.0-29.9): ICD-10-CM

## 2018-03-16 DIAGNOSIS — F39 EPISODIC MOOD DISORDER (H): ICD-10-CM

## 2018-03-16 DIAGNOSIS — F32.5 MAJOR DEPRESSION IN COMPLETE REMISSION (H): ICD-10-CM

## 2018-03-16 DIAGNOSIS — S06.9X0D TRAUMATIC BRAIN INJURY, WITHOUT LOSS OF CONSCIOUSNESS, SUBSEQUENT ENCOUNTER: ICD-10-CM

## 2018-03-16 PROBLEM — E66.811 CLASS 1 OBESITY DUE TO EXCESS CALORIES WITH SERIOUS COMORBIDITY AND BODY MASS INDEX (BMI) OF 31.0 TO 31.9 IN ADULT: Status: ACTIVE | Noted: 2018-03-16

## 2018-03-16 PROBLEM — E66.811 CLASS 1 OBESITY DUE TO EXCESS CALORIES WITH SERIOUS COMORBIDITY AND BODY MASS INDEX (BMI) OF 31.0 TO 31.9 IN ADULT: Status: RESOLVED | Noted: 2018-03-16 | Resolved: 2018-03-16

## 2018-03-16 PROBLEM — E66.09 CLASS 1 OBESITY DUE TO EXCESS CALORIES WITH SERIOUS COMORBIDITY AND BODY MASS INDEX (BMI) OF 31.0 TO 31.9 IN ADULT: Status: RESOLVED | Noted: 2018-03-16 | Resolved: 2018-03-16

## 2018-03-16 PROBLEM — E66.09 CLASS 1 OBESITY DUE TO EXCESS CALORIES WITH SERIOUS COMORBIDITY AND BODY MASS INDEX (BMI) OF 31.0 TO 31.9 IN ADULT: Status: ACTIVE | Noted: 2018-03-16

## 2018-03-16 PROCEDURE — 99395 PREV VISIT EST AGE 18-39: CPT | Performed by: FAMILY MEDICINE

## 2018-03-16 RX ORDER — FLUOXETINE 40 MG/1
40 CAPSULE ORAL DAILY
COMMUNITY
End: 2019-01-28

## 2018-03-16 ASSESSMENT — ANXIETY QUESTIONNAIRES
7. FEELING AFRAID AS IF SOMETHING AWFUL MIGHT HAPPEN: NOT AT ALL
6. BECOMING EASILY ANNOYED OR IRRITABLE: NOT AT ALL
2. NOT BEING ABLE TO STOP OR CONTROL WORRYING: NOT AT ALL
3. WORRYING TOO MUCH ABOUT DIFFERENT THINGS: NOT AT ALL
IF YOU CHECKED OFF ANY PROBLEMS ON THIS QUESTIONNAIRE, HOW DIFFICULT HAVE THESE PROBLEMS MADE IT FOR YOU TO DO YOUR WORK, TAKE CARE OF THINGS AT HOME, OR GET ALONG WITH OTHER PEOPLE: NOT DIFFICULT AT ALL
1. FEELING NERVOUS, ANXIOUS, OR ON EDGE: NOT AT ALL
5. BEING SO RESTLESS THAT IT IS HARD TO SIT STILL: MORE THAN HALF THE DAYS
GAD7 TOTAL SCORE: 2

## 2018-03-16 ASSESSMENT — PATIENT HEALTH QUESTIONNAIRE - PHQ9: 5. POOR APPETITE OR OVEREATING: NOT AT ALL

## 2018-03-16 NOTE — PROGRESS NOTES
SUBJECTIVE:   CC: Hamilton Hastings is an 22 year old male who presents for preventative health visit.     Physical   Annual:     Getting at least 3 servings of Calcium per day::  NO    Bi-annual eye exam::  Yes    Dental care twice a year::  Yes    Sleep apnea or symptoms of sleep apnea::  None    Taking medications regularly::  Yes    Medication side effects::  None                    Today's PHQ-2 Score:   PHQ-2 ( 1999 [employer]) 3/16/2018   Q1: Little interest or pleasure in doing things 0   Q2: Feeling down, depressed or hopeless 0   PHQ-2 Score 0   Q1: Little interest or pleasure in doing things Not at all   Q2: Feeling down, depressed or hopeless Not at all   PHQ-2 Score 0       Abuse: Current or Past(Physical, Sexual or Emotional)- No  Do you feel safe in your environment - Yes    Social History   Substance Use Topics     Smoking status: Never Smoker     Smokeless tobacco: Never Used     Alcohol use No     No flowsheet data found.    Last PSA: No results found for: PSA    Reviewed orders with patient. Reviewed health maintenance and updated orders accordingly - Yes  Labs reviewed in EPIC    Reviewed and updated as needed this visit by clinical staff  Tobacco  Allergies  Meds  Problems  Med Hx  Surg Hx  Fam Hx  Soc Hx          Reviewed and updated as needed this visit by Provider  Allergies  Meds  Problems            Review of Systems  C: NEGATIVE for fever, chills, change in weight  I: NEGATIVE for worrisome rashes, moles or lesions  E: NEGATIVE for vision changes or irritation  ENT: NEGATIVE for ear, mouth and throat problems  R: NEGATIVE for significant cough or SOB  CV: NEGATIVE for chest pain, palpitations or peripheral edema  GI: NEGATIVE for nausea, abdominal pain, heartburn, or change in bowel habits   male: negative for dysuria, hematuria, decreased urinary stream, erectile dysfunction, urethral discharge  M: NEGATIVE for significant arthralgias or myalgia  N: NEGATIVE for weakness,  "dizziness or paresthesias  PSYCHIATRIC: POSITIVE for, agitation, anxiety, depressed mood, HX anxiety and HX depression    OBJECTIVE:   /80  Pulse 89  Temp 98.2  F (36.8  C) (Tympanic)  Resp 22  Ht 6' 2.25\" (1.886 m)  Wt 210 lb (95.3 kg)  SpO2 98%  BMI 26.78 kg/m2    Physical Exam  GENERAL: healthy, alert, no distress, obese and fatigued  EYES: Eyes grossly normal to inspection, PERRL and conjunctivae and sclerae normal  NECK: no adenopathy, no asymmetry, masses, or scars and thyroid normal to palpation  RESP: lungs clear to auscultation - no rales, rhonchi or wheezes  CV: regular rate and rhythm, normal S1 S2, no S3 or S4, no murmur, click or rub, no peripheral edema and peripheral pulses strong  ABDOMEN: soft, nontender, no hepatosplenomegaly, no masses and bowel sounds normal  MS: no gross musculoskeletal defects noted, no edema  SKIN: no suspicious lesions or rashes  NEURO: Normal strength and tone, mentation intact and speech normal  PSYCH: mentation appears normal, affect normal/bright  LYMPH: no cervical, supraclavicular, axillary, or inguinal adenopathy    ASSESSMENT/PLAN:       ICD-10-CM    1. Routine history and physical examination of adult Z00.00    2. Autism F84.0    3. Traumatic brain injury, without loss of consciousness, subsequent encounter S06.9X0D    4. Episodic mood disorder (H) in remission on meds F39    5. Major depression in complete remission (H) F32.5    6. Overweight (BMI 25.0-29.9) E66.3        COUNSELING:   Reviewed preventive health counseling, as reflected in patient instructions       Regular exercise       Healthy diet/nutrition       Vision screening    Patient Instructions   1.  Weight Loss Tips  1. Do not eat after 6 hrs before your expected bedtime  2. Have your heaviest meal for breakfast, a slightly lighter meal at lunch and a snack 6 hrs before bed  3. No sugar/calorie drinks except milk ie no fruit juice, pop, alcohol.  4. Drink milk 30min before meals to decrease " "your hunger. Also it is excellent as part of your last meal of the day snack  5. Drink lots of water  6. Increase fiber in diet: all bran cereal, salads, popcorn etc  7. Have only one small serving of fruit a day about 1/2 cup (as this is high in sugar)  8. EXERCISE is the bottom line. Without it, you will gain weight even on a low calorie diet. Best if done 2-3X a day as can    Being overweight contributes to high blood pressure and high cholesterol, both of which cause heart attacks, strokes and kidney failure, prediabetes and diabetes, arthritis, and liver disease     2. Please do your breast exam every mo, when you  Change the  calendar page or set an alarm on your cell phone Do a  visual check for dimples, inversion or indentation or any different position of the nipple Feel manually  for any 1cm or larger  size mass ie about the size of an almond Be sure to cover the entire area of both breasts : this extends back to the back on either side and from the collar bone to the bottom of the breasts where you can begin to feel ribs.    3. And do an exam of  Your testicles every mo     3. Please return/do PHQ-9 in 5 mo          reports that he has never smoked. He has never used smokeless tobacco.    Estimated body mass index is 26.78 kg/(m^2) as calculated from the following:    Height as of this encounter: 6' 2.25\" (1.886 m).    Weight as of this encounter: 210 lb (95.3 kg).   Weight management plan: Discussed healthy diet and exercise guidelines and patient will follow up in 6 months in clinic to re-evaluate.    Counseling Resources:  ATP IV Guidelines  Pooled Cohorts Equation Calculator  FRAX Risk Assessment  ICSI Preventive Guidelines  Dietary Guidelines for Americans, 2010  USDA's MyPlate  ASA Prophylaxis  Lung CA Screening    Ayesha Davis MD  [facility]  Answers for HPI/ROS submitted by the patient on 3/16/2018   PHQ-2 Score: 0    "

## 2018-03-16 NOTE — NURSING NOTE
"Chief Complaint   Patient presents with     Physical     /80  Pulse 89  Temp 98.2  F (36.8  C) (Tympanic)  Resp 22  Ht 6' 2.25\" (1.886 m)  Wt 210 lb (95.3 kg)  SpO2 98%  BMI 26.78 kg/m2 Estimated body mass index is 26.78 kg/(m^2) as calculated from the following:    Height as of this encounter: 6' 2.25\" (1.886 m).    Weight as of this encounter: 210 lb (95.3 kg).  BP completed using cuff size: cinthya Fowler CMA    There are no preventive care reminders to display for this patient.  Health Maintenance reviewed at today's visit patient asked to schedule/complete:   None, Health Maintenance up to date.    "

## 2018-03-16 NOTE — LETTER
My Depression Action Plan  Name: Hamilton Hastings   Date of Birth 1995  Date: 3/16/2018    My doctor: Ayesha Davis   My clinic: [address] [address]  [phone]          GREEN    ZONE   Good Control    What it looks like:     Things are going generally well. You have normal up s and down s. You may even feel depressed from time to time, but bad moods usually last less than a day.   What you need to do:  1. Continue to care for yourself (see self care plan)  2. Check your depression survival kit and update it as needed  3. Follow your physician s recommendations including any medication.  4. Do not stop taking medication unless you consult with your physician first.           YELLOW         ZONE Getting Worse    What it looks like:     Depression is starting to interfere with your life.     It may be hard to get out of bed; you may be starting to isolate yourself from others.    Symptoms of depression are starting to last most all day and this has happened for several days.     You may have suicidal thoughts but they are not constant.   What you need to do:     1. Call your care team, your response to treatment will improve if you keep your care team informed of your progress. Yellow periods are signs an adjustment may need to be made.     2. Continue your self-care, even if you have to fake it!    3. Talk to someone in your support network    4. Open up your depression survival kit           RED    ZONE Medical Alert - Get Help    What it looks like:     Depression is seriously interfering with your life.     You may experience these or other symptoms: You can t get out of bed most days, can t work or engage in other necessary activities, you have trouble taking care of basic hygiene, or basic responsibilities, thoughts of suicide or death that will not go away, self-injurious behavior.     What  you need to do:  1. Call your care team and request a same-day appointment. If they are not available (weekends or after hours) call your local crisis line, emergency room or 911.            Depression Self Care Plan / Survival Kit    Self-Care for Depression  Here s the deal. Your body and mind are really not as separate as most people think.  What you do and think affects how you feel and how you feel influences what you do and think. This means if you do things that people who feel good do, it will help you feel better.  Sometimes this is all it takes.  There is also a place for medication and therapy depending on how severe your depression is, so be sure to consult with your medical provider and/ or Behavioral Health Consultant if your symptoms are worsening or not improving.     In order to better manage my stress, I will:    Exercise  Get some form of exercise, every day. This will help reduce pain and release endorphins, the  feel good  chemicals in your brain. This is almost as good as taking antidepressants!  This is not the same as joining a gym and then never going! (they count on that by the way ) It can be as simple as just going for a walk or doing some gardening, anything that will get you moving.      Hygiene   Maintain good hygiene (Get out of bed in the morning, Make your bed, Brush your teeth, Take a shower, and Get dressed like you were going to work, even if you are unemployed).  If your clothes don't fit try to get ones that do.    Diet  I will strive to eat foods that are good for me, drink plenty of water, and avoid excessive sugar, caffeine, alcohol, and other mood-altering substances.  Some foods that are helpful in depression are: complex carbohydrates, B vitamins, flaxseed, fish or fish oil, fresh fruits and vegetables.    Psychotherapy  I agree to participate in Individual Therapy (if recommended).    Medication  If prescribed medications, I agree to take them.  Missing doses can result  in serious side effects.  I understand that drinking alcohol, or other illicit drug use, may cause potential side effects.  I will not stop my medication abruptly without first discussing it with my provider.    Staying Connected With Others  I will stay in touch with my friends, family members, and my primary care provider/team.    Use your imagination  Be creative.  We all have a creative side; it doesn t matter if it s oil painting, sand castles, or mud pies! This will also kick up the endorphins.    Witness Beauty  (AKA stop and smell the roses) Take a look outside, even in mid-winter. Notice colors, textures. Watch the squirrels and birds.     Service to others  Be of service to others.  There is always someone else in need.  By helping others we can  get out of ourselves  and remember the really important things.  This also provides opportunities for practicing all the other parts of the program.    Humor  Laugh and be silly!  Adjust your TV habits for less news and crime-drama and more comedy.    Control your stress  Try breathing deep, massage therapy, biofeedback, and meditation. Find time to relax each day.     My support system    Clinic Contact:  Phone number:    Contact 1:  Phone number:    Contact 2:  Phone number:    Amish/:  Phone number:    Therapist:  Phone number:    Local crisis center:    Phone number:    Other community support:  Phone number:

## 2018-03-16 NOTE — MR AVS SNAPSHOT
After Visit Summary   3/16/2018    Hamilton Hastings    MRN: [id number]           Patient Information     Date Of Birth          1995        Visit Information        Provider Department      3/16/2018 10:00 AM Ayesha Davis MD [facility]        Today's Diagnoses     Routine history and physical examination of adult    -  1    Autism        Traumatic brain injury, without loss of consciousness, subsequent encounter        Episodic mood disorder (H) in remission on meds        Major depression in complete remission (H)        Overweight (BMI 25.0-29.9)          Care Instructions    1.  Weight Loss Tips  1. Do not eat after 6 hrs before your expected bedtime  2. Have your heaviest meal for breakfast, a slightly lighter meal at lunch and a snack 6 hrs before bed  3. No sugar/calorie drinks except milk ie no fruit juice, pop, alcohol.  4. Drink milk 30min before meals to decrease your hunger. Also it is excellent as part of your last meal of the day snack  5. Drink lots of water  6. Increase fiber in diet: all bran cereal, salads, popcorn etc  7. Have only one small serving of fruit a day about 1/2 cup (as this is high in sugar)  8. EXERCISE is the bottom line. Without it, you will gain weight even on a low calorie diet. Best if done 2-3X a day as can    Being overweight contributes to high blood pressure and high cholesterol, both of which cause heart attacks, strokes and kidney failure, prediabetes and diabetes, arthritis, and liver disease     2. Please do your breast exam every mo, when you  Change the  calendar page or set an alarm on your cell phone Do a  visual check for dimples, inversion or indentation or any different position of the nipple Feel manually  for any 1cm or larger  size mass ie about the size of an almond Be sure to cover the entire area of both breasts : this extends back to the back on either side and from the collar bone to the  "bottom of the breasts where you can begin to feel ribs.    3. And do an exam of  Your testicles every mo     3. Please return/do PHQ-9 in 5 mo           Follow-ups after your visit        Follow-up notes from your care team     Return in about 5 months (around 2018) for Routine Visit/PHQ-9.      Who to contact     If you have questions or need follow up information about today's clinic visit or your schedule please contact [facility] directly at [phone].  Normal or non-critical lab and imaging results will be communicated to you by [employer], letter or phone within 4 business days after the clinic has received the results. If you do not hear from us within 7 days, please contact the clinic through [employer] or phone. If you have a critical or abnormal lab result, we will notify you by phone as soon as possible.  Submit refill requests through [employer] or call your pharmacy and they will forward the refill request to us. Please allow 3 business days for your refill to be completed.          Additional Information About Your Visit        [employer] Information     [employer] lets you send messages to your doctor, view your test results, renew your prescriptions, schedule appointments and more. To sign up, go to [email or web address] . Click on \"Log in\" on the left side of the screen, which will take you to the Welcome page. Then click on \"Sign up Now\" on the right side of the page.     You will be asked to enter the access code listed below, as well as some personal information. Please follow the directions to create your username and password.     Your access code is: [email or web address]  Expires: 2018 10:40 AM     Your access code will  in 90 days. If you need help or a new code, please call your [facility] or [phone].        Care EveryWhere ID     This is your Care EveryWhere ID. This could be used by other organizations to access your [place] medical " "records  [id number]        Your Vitals Were     Pulse Temperature Respirations Height Pulse Oximetry BMI (Body Mass Index)    89 98.2  F (36.8  C) (Tympanic) 22 6' 2.25\" (1.886 m) 98% 26.78 kg/m2       Blood Pressure from Last 3 Encounters:   03/16/18 120/80   03/09/17 118/78   02/27/17 114/70    Weight from Last 3 Encounters:   03/16/18 210 lb (95.3 kg)   03/09/17 241 lb (109.3 kg)   02/27/17 242 lb (109.8 kg)              We Performed the Following     DEPRESSION ACTION PLAN (DAP)        Primary Care Provider Office Phone # Fax #    Ayesha aDvis [phone]       [address] [facility] [address]        Equal Access to Services     [facility]: Hadii aad ku hadasho Soomaali, waaxda luqadaha, qaybta kaalmada adeegyada, waxay magalie nath . So [facility] [phone].    ATENCIÓN: Si habla español, tiene a murry disposición servicios gratuitos de asistencia lingüística. Llame al [phone].    We comply with applicable federal civil rights laws and [place] laws. We do not discriminate on the basis of race, color, national origin, age, disability, sex, sexual orientation, or gender identity.            Thank you!     [facility]. Our goal is always to provide you with excellent care. Hearing back from our patients is one way we can continue to improve our services. Please take a few minutes to complete the written survey that you may receive in the mail after your visit with us. Thank you!             Your Updated Medication List - Protect others around you: Learn how to safely use, store and throw away your medicines at www.disposemymeds.org.          This list is accurate as of 3/16/18 10:40 AM.  Always use your most recent med list.                   Brand Name Dispense Instructions for use Diagnosis    ABILIFY 20 MG tablet   Generic drug:  ARIPiprazole      Take 0.5 tablets (10 mg) by " mouth 2 times daily    Autism       ALPHA LIPOIC ACID PO           buPROPion 100 MG 12 hr tablet    WELLBUTRIN SR    60 tablet    Take 1 tablet (100 mg) by mouth daily        cholecalciferol 1000 UNIT tablet    vitamin D3    100 tablet    Take 1 tablet (1,000 Units) by mouth daily    Nutritional deficiency       [facility] DIGESTIVE HEALTH Caps     90 capsule    Take 1 capsule by mouth daily    Nutritional deficiency       fish oil-omega-3 fatty acids 1000 MG capsule     90 capsule    Take 1 capsule by mouth daily    Nutritional deficiency       * FLUoxetine 40 MG capsule    PROzac     Take 40 mg by mouth daily        * FLUoxetine 10 MG capsule    PROzac    90 capsule    Take 2 capsules (20 mg) by mouth 2 times daily        magnesium 250 MG tablet     30 tablet    Take 1 tablet by mouth daily        MELATONIN PO      Take 3 mg by mouth        multivitamin, therapeutic Tabs tablet      Take 1 tablet by mouth daily Reported on 2/27/2017        * Notice:  This list has 2 medication(s) that are the same as other medications prescribed for you. Read the directions carefully, and ask your doctor or other care provider to review them with you.

## 2018-03-16 NOTE — PATIENT INSTRUCTIONS
1.  Weight Loss Tips  1. Do not eat after 6 hrs before your expected bedtime  2. Have your heaviest meal for breakfast, a slightly lighter meal at lunch and a snack 6 hrs before bed  3. No sugar/calorie drinks except milk ie no fruit juice, pop, alcohol.  4. Drink milk 30min before meals to decrease your hunger. Also it is excellent as part of your last meal of the day snack  5. Drink lots of water  6. Increase fiber in diet: all bran cereal, salads, popcorn etc  7. Have only one small serving of fruit a day about 1/2 cup (as this is high in sugar)  8. EXERCISE is the bottom line. Without it, you will gain weight even on a low calorie diet. Best if done 2-3X a day as can    Being overweight contributes to high blood pressure and high cholesterol, both of which cause heart attacks, strokes and kidney failure, prediabetes and diabetes, arthritis, and liver disease     2. Please do your breast exam every mo, when you  Change the  calendar page or set an alarm on your cell phone Do a  visual check for dimples, inversion or indentation or any different position of the nipple Feel manually  for any 1cm or larger  size mass ie about the size of an almond Be sure to cover the entire area of both breasts : this extends back to the back on either side and from the collar bone to the bottom of the breasts where you can begin to feel ribs.    3. And do an exam of  Your testicles every mo     3. Please return/do PHQ-9 in 5 mo

## 2018-03-17 ASSESSMENT — PATIENT HEALTH QUESTIONNAIRE - PHQ9: SUM OF ALL RESPONSES TO PHQ QUESTIONS 1-9: 1

## 2018-03-17 ASSESSMENT — ANXIETY QUESTIONNAIRES: GAD7 TOTAL SCORE: 2

## 2018-04-10 ENCOUNTER — HOSPITAL ENCOUNTER (OUTPATIENT)
Dept: NEUROLOGY | Facility: CLINIC | Age: 23
Setting detail: THERAPIES SERIES
Discharge: STILL ADMITTED OR ADMINISTRATIVE | End: 2018-04-10
Attending: NURSE PRACTITIONER

## 2018-04-10 DIAGNOSIS — F06.30 MOOD DISORDER IN CONDITIONS CLASSIFIED ELSEWHERE: ICD-10-CM

## 2018-04-13 ENCOUNTER — COMMUNICATION - HEALTHEAST (OUTPATIENT)
Dept: NEUROLOGY | Facility: CLINIC | Age: 23
End: 2018-04-13

## 2018-04-13 DIAGNOSIS — F06.30 MOOD DISORDER IN CONDITIONS CLASSIFIED ELSEWHERE: ICD-10-CM

## 2018-06-07 ENCOUNTER — COMMUNICATION - HEALTHEAST (OUTPATIENT)
Dept: NEUROLOGY | Facility: CLINIC | Age: 23
End: 2018-06-07

## 2018-06-07 DIAGNOSIS — F06.30 MOOD DISORDER IN CONDITIONS CLASSIFIED ELSEWHERE: ICD-10-CM

## 2018-06-13 ENCOUNTER — COMMUNICATION - HEALTHEAST (OUTPATIENT)
Dept: NEUROLOGY | Facility: CLINIC | Age: 23
End: 2018-06-13

## 2018-06-13 DIAGNOSIS — F06.30 MOOD DISORDER IN CONDITIONS CLASSIFIED ELSEWHERE: ICD-10-CM

## 2018-06-26 ENCOUNTER — HOSPITAL ENCOUNTER (OUTPATIENT)
Dept: NEUROLOGY | Facility: CLINIC | Age: 23
Setting detail: THERAPIES SERIES
Discharge: STILL ADMITTED OR ADMINISTRATIVE | End: 2018-06-26
Attending: NURSE PRACTITIONER

## 2018-06-26 DIAGNOSIS — F39 EPISODIC MOOD DISORDER (H): ICD-10-CM

## 2018-09-23 ENCOUNTER — APPOINTMENT (OUTPATIENT)
Dept: GENERAL RADIOLOGY | Facility: CLINIC | Age: 23
End: 2018-09-23
Attending: EMERGENCY MEDICINE
Payer: COMMERCIAL

## 2018-09-23 ENCOUNTER — HOSPITAL ENCOUNTER (EMERGENCY)
Facility: CLINIC | Age: 23
Discharge: HOME | End: 2018-09-23
Attending: EMERGENCY MEDICINE | Admitting: EMERGENCY MEDICINE
Payer: COMMERCIAL

## 2018-09-23 VITALS
DIASTOLIC BLOOD PRESSURE: 82 MMHG | OXYGEN SATURATION: 100 % | BODY MASS INDEX: 27.35 KG/M2 | WEIGHT: 220 LBS | RESPIRATION RATE: 16 BRPM | SYSTOLIC BLOOD PRESSURE: 126 MMHG | HEART RATE: 82 BPM | TEMPERATURE: 98 F | HEIGHT: 75 IN

## 2018-09-23 DIAGNOSIS — S62.101A FRACTURE OF WRIST, RIGHT, CLOSED, INITIAL ENCOUNTER: ICD-10-CM

## 2018-09-23 PROCEDURE — 29125 APPL SHORT ARM SPLINT STATIC: CPT | Mod: RT

## 2018-09-23 PROCEDURE — 25600 CLTX DST RDL FX/EPHYS SEP WO: CPT | Mod: RT

## 2018-09-23 PROCEDURE — 73100 X-RAY EXAM OF WRIST: CPT | Mod: RT,52

## 2018-09-23 PROCEDURE — 99284 EMERGENCY DEPT VISIT MOD MDM: CPT

## 2018-09-23 PROCEDURE — 99284 EMERGENCY DEPT VISIT MOD MDM: CPT | Mod: 25

## 2018-09-23 PROCEDURE — 73110 X-RAY EXAM OF WRIST: CPT | Mod: RT

## 2018-09-23 RX ORDER — HYDROCODONE BITARTRATE AND ACETAMINOPHEN 5; 325 MG/1; MG/1
1 TABLET ORAL EVERY 6 HOURS PRN
Qty: 10 TABLET | Refills: 0 | Status: SHIPPED | OUTPATIENT
Start: 2018-09-23 | End: 2019-01-28

## 2018-09-23 ASSESSMENT — ENCOUNTER SYMPTOMS: NUMBNESS: 0

## 2018-09-23 NOTE — DISCHARGE INSTRUCTIONS
Wrist Fracture, General  You have a broken bone (fracture) in your wrist. This may be a small crack or chip in the bone. Or it may be a major break, with the broken parts pushed out of position. Wrist fractures are often treated with a splint or cast. They take about 4 to 6 weeks to heal. Severe injuries may need surgery.    Home care  Follow these guidelines when caring for yourself at home:    Keep your arm elevated to reduce pain and swelling. When sitting or lying down keep your arm above the level of your heart. You can do this by placing your arm on a pillow that rests on your chest or on a pillow at your side. This is most important during the first 2 days (48 hours) after the injury.    Put an ice pack on the injured area. Do this for 20 minutes every 1 to 2 hours the first day for pain relief. You can make an ice pack by wrapping a plastic bag of ice cubes in a thin towel. As the ice melts, be careful that the cast or splint doesn t get wet. Continue using the ice pack 3 to 4 times a day for the next 2 days. Then use the ice pack as needed to ease pain and swelling.    Keep the cast or splint completely dry at all times. Bathe with your cast or splint out of the water. Protect it with a large plastic bag, rubber-banded at the top end. If a fiberglass cast or splint gets wet, you can dry it with a hair dryer on a cool setting.    You may use acetaminophen or ibuprofen to control pain, unless another pain medicine was prescribed. If you have chronic liver or kidney disease, talk with your healthcare provider before using these medicines. Also talk with your provider if you ve had a stomach ulcer or gastrointestinal bleeding.    Don t put creams, lotions, or objects under the cast.  Follow-up care  Follow up with your healthcare provider, or as advised. This is to make sure the bone is healing the way it should. If a splint was put on, it may be changed to a cast during your follow-up visit. A cast may need  to be changed at 2 to 3 weeks, as the swelling goes down.  If X-rays were taken, a radiologist may look at them. You will be told of any new findings that may affect your care.  When to seek medical advice  Call your healthcare provider right away if any of these occur:    The plaster cast or splint becomes wet or soft    The cast or splint cracks    Bad odor from the cast or wound fluid stains the cast    The fiberglass cast or splint stays wet for more than 24 hours    Tightness or pain under the cast or splint gets worse    Fingers become swollen, cold, blue, numb, or tingly    You can t move your fingers    Skin around cast becomes red, swollen, or irritated  Date Last Reviewed: 5/1/2017 2000-2017 [employer]. [address] [place] [address]. All rights reserved. This information is not intended as a substitute for professional medical care. Always follow your healthcare professional's instructions.          Discharge Instructions: Caring for Your Splint  You will be going home with a splint. This is sometimes called a removable cast. A splint helps your body heal by holding your injured bones or joints in place. Take good care of your splint. A damaged splint can keep your injury from healing well. If your splint becomes damaged or loses its shape, you may need to replace it.   You have a broken ___________________ bone.  This bone is located in your ____________.   Home care    Wear your splint according to your doctor s instructions.    Keep the splint dry at all times. Bathe with your splint well out of the water. You can hold the splint outside the tub or shower when bathing. Protect it with a large plastic bag closed at the top end with a rubber band. Use two layers of plastic to help keep the splint dry. Or you can buy a waterproof shield.    If a splint gets wet, dry it with a hair dryer on the  cool  setting. Don t use the warm or hot setting, because those settings can burn  your skin.    Always keep the splint clean and away from dirt.    Wash the Velcro straps and inner cloth sleeve (stockinet) with soapy water and air dry.    Keep your splint away from open flames.    Don t expose your splint to heat, space heaters, or prolonged sunlight. Excessive heat will cause the splint to change shape.    Don t cut or tear the splint.     Exercise all the nearby joints not kept still by the splint. If you have a long leg splint, exercise your hip joint and your toes. If you have an arm splint, exercise your shoulder, elbow, thumb, and fingers.    Elevate the part of your body that is in the splint. This helps reduce swelling.  Follow-up care  Make a follow-up appointment with your healthcare provider, or as advised.  When to call your healthcare provider  Call your healthcare provider right away if you have any of these:    Tingling or numbness in the affected area    Severe pain that cannot be relieved with medicine    Cast that feels too tight or too loose    Swelling, coldness, or blue-gray color in the fingers or toes    Cast that is damaged, cracked, or has rough edges that hurt    Pressure sores or red marks that don t go away within 1 hour after removing the splint    Blisters   Date Last Reviewed: 7/1/2016 2000-2017 [employer]. [address] [place] [address]. All rights reserved. This information is not intended as a substitute for professional medical care. Always follow your healthcare professional's instructions.      Opioid Medication Information    You have been given a prescription for an opioid (narcotic) pain medicine and/or have received a pain medicine while here in the Emergency Department. These medicines can make you drowsy or impaired. You must not drive, operate dangerous equipment, or engage in any other dangerous activities while taking these medications. If you drive while taking these medications, you could be arrested for driving under  the influence (DUI). Do not drink any alcohol while you are taking these medications.     Opioid pain medications can cause addiction. If you have a history of chemical dependency of any type, you are at a higher risk of becoming addicted to pain medications.  Only take these prescribed medications to treat your pain when all other options have been tried. Take it for as short a time and as few doses as possible. Store your pain pills in a secure place, as they are frequently stolen and provide a dangerous opportunity for children or visitors in your house to start abusing these powerful medications. We will not replace any lost or stolen medicine.    If you do not finish your medication, it is a good idea to get rid of it but please do not flush it down the toilet. Please dispose of the remaining medication at a local pharmacy or law enforcement facility. The [place] [employer]ution [employer] has additional information on medication disposal: https://www.pca.[facility].[place].us/living-green/managing-unwanted-medications.      Many prescription pain medications contain Tylenol  (acetaminophen), including Vicodin , Tylenol #3 , Norco , Lortab , and Percocet .  You should not take any extra pills of Tylenol  if you are using these prescription medications or you can get very sick.  Do not ever take more than 3000 mg of acetaminophen in any 24 hour period.    All opioids tend to cause constipation. Drink plenty of water and eat foods that have a lot of fiber, such as fruits, vegetables, prune juice, apple juice and high fiber cereal.  Take a laxative if you don t move your bowels at least every other day. Miralax , Milk of Magnesia, Colace , or Senna  can be used to keep you regular.

## 2018-09-23 NOTE — ED AVS SNAPSHOT
Emergency Department    [address] [place] [address]    Phone:  [phone]    Fax:  [phone]                                       Hamilton Hastings   MRN: [id number]    Department:   Emergency Department   Date of Visit:  9/23/2018           After Visit Summary Signature Page     I have received my discharge instructions, and my questions have been answered. I have discussed any challenges I see with this plan with the nurse or doctor.    ..........................................................................................................................................  Patient/Patient Representative Signature      ..........................................................................................................................................  Patient Representative Print Name and Relationship to Patient    ..................................................               ................................................  Date                                   Time    ..........................................................................................................................................  Reviewed by Signature/Title    ...................................................              ..............................................  Date                                               Time          22EPIC Rev 08/18

## 2018-09-23 NOTE — ED TRIAGE NOTES
Pt states playing softball and he was batting. Pt ran to first base then on to second base where another player ran into him. Pt fell to ground onto the Right wrist. Pt wrist swollen and painful.

## 2018-09-23 NOTE — ED AVS SNAPSHOT
Emergency Department    [address] [facility] [address]    Phone:  [phone]    Fax:  [phone]                                       Hamilton Hastings   MRN: [id number]    Department:   Emergency Department   Date of Visit:  9/23/2018           Patient Information     Date Of Birth          1995        Your diagnoses for this visit were:     Fracture of wrist, right, closed, initial encounter        You were seen by Brendon Delarosa MD.      Follow-up Information     Follow up with Orthopaedics, [facility] In 3 days.    Contact information:    [address] [address]  [phone]          Follow up with  Emergency Department.    Specialty:  EMERGENCY MEDICINE    Why:  As needed, If symptoms worsen    Contact information:    [address] [facility] [address] [phone]        Discharge Instructions         Wrist Fracture, General  You have a broken bone (fracture) in your wrist. This may be a small crack or chip in the bone. Or it may be a major break, with the broken parts pushed out of position. Wrist fractures are often treated with a splint or cast. They take about 4 to 6 weeks to heal. Severe injuries may need surgery.    Home care  Follow these guidelines when caring for yourself at home:    Keep your arm elevated to reduce pain and swelling. When sitting or lying down keep your arm above the level of your heart. You can do this by placing your arm on a pillow that rests on your chest or on a pillow at your side. This is most important during the first 2 days (48 hours) after the injury.    Put an ice pack on the injured area. Do this for 20 minutes every 1 to 2 hours the first day for pain relief. You can make an ice pack by wrapping a plastic bag of ice cubes in a thin towel. As the ice melts, be careful that the cast or splint doesn t get wet. Continue using the ice pack 3 to 4 times a day for the next 2 days. Then use the ice  pack as needed to ease pain and swelling.    Keep the cast or splint completely dry at all times. Bathe with your cast or splint out of the water. Protect it with a large plastic bag, rubber-banded at the top end. If a fiberglass cast or splint gets wet, you can dry it with a hair dryer on a cool setting.    You may use acetaminophen or ibuprofen to control pain, unless another pain medicine was prescribed. If you have chronic liver or kidney disease, talk with your healthcare provider before using these medicines. Also talk with your provider if you ve had a stomach ulcer or gastrointestinal bleeding.    Don t put creams, lotions, or objects under the cast.  Follow-up care  Follow up with your healthcare provider, or as advised. This is to make sure the bone is healing the way it should. If a splint was put on, it may be changed to a cast during your follow-up visit. A cast may need to be changed at 2 to 3 weeks, as the swelling goes down.  If X-rays were taken, a radiologist may look at them. You will be told of any new findings that may affect your care.  When to seek medical advice  Call your healthcare provider right away if any of these occur:    The plaster cast or splint becomes wet or soft    The cast or splint cracks    Bad odor from the cast or wound fluid stains the cast    The fiberglass cast or splint stays wet for more than 24 hours    Tightness or pain under the cast or splint gets worse    Fingers become swollen, cold, blue, numb, or tingly    You can t move your fingers    Skin around cast becomes red, swollen, or irritated  Date Last Reviewed: 5/1/2017 2000-2017 [employer]. [address] [place] [address]. All rights reserved. This information is not intended as a substitute for professional medical care. Always follow your healthcare professional's instructions.          Discharge Instructions: Caring for Your Splint  You will be going home with a splint. This is  sometimes called a removable cast. A splint helps your body heal by holding your injured bones or joints in place. Take good care of your splint. A damaged splint can keep your injury from healing well. If your splint becomes damaged or loses its shape, you may need to replace it.   You have a broken ___________________ bone.  This bone is located in your ____________.   Home care    Wear your splint according to your doctor s instructions.    Keep the splint dry at all times. Bathe with your splint well out of the water. You can hold the splint outside the tub or shower when bathing. Protect it with a large plastic bag closed at the top end with a rubber band. Use two layers of plastic to help keep the splint dry. Or you can buy a waterproof shield.    If a splint gets wet, dry it with a hair dryer on the  cool  setting. Don t use the warm or hot setting, because those settings can burn your skin.    Always keep the splint clean and away from dirt.    Wash the Velcro straps and inner cloth sleeve (stockinet) with soapy water and air dry.    Keep your splint away from open flames.    Don t expose your splint to heat, space heaters, or prolonged sunlight. Excessive heat will cause the splint to change shape.    Don t cut or tear the splint.     Exercise all the nearby joints not kept still by the splint. If you have a long leg splint, exercise your hip joint and your toes. If you have an arm splint, exercise your shoulder, elbow, thumb, and fingers.    Elevate the part of your body that is in the splint. This helps reduce swelling.  Follow-up care  Make a follow-up appointment with your healthcare provider, or as advised.  When to call your healthcare provider  Call your healthcare provider right away if you have any of these:    Tingling or numbness in the affected area    Severe pain that cannot be relieved with medicine    Cast that feels too tight or too loose    Swelling, coldness, or blue-gray color in the  fingers or toes    Cast that is damaged, cracked, or has rough edges that hurt    Pressure sores or red marks that don t go away within 1 hour after removing the splint    Blisters   Date Last Reviewed: 7/1/2016 2000-2017 [employer]. [address] [place] [address]. All rights reserved. This information is not intended as a substitute for professional medical care. Always follow your healthcare professional's instructions.      Opioid Medication Information    You have been given a prescription for an opioid (narcotic) pain medicine and/or have received a pain medicine while here in the Emergency Department. These medicines can make you drowsy or impaired. You must not drive, operate dangerous equipment, or engage in any other dangerous activities while taking these medications. If you drive while taking these medications, you could be arrested for driving under the influence (DUI). Do not drink any alcohol while you are taking these medications.     Opioid pain medications can cause addiction. If you have a history of chemical dependency of any type, you are at a higher risk of becoming addicted to pain medications.  Only take these prescribed medications to treat your pain when all other options have been tried. Take it for as short a time and as few doses as possible. Store your pain pills in a secure place, as they are frequently stolen and provide a dangerous opportunity for children or visitors in your house to start abusing these powerful medications. We will not replace any lost or stolen medicine.    If you do not finish your medication, it is a good idea to get rid of it but please do not flush it down the toilet. Please dispose of the remaining medication at a local pharmacy or law enforcement facility. The [place] Pollution Control Agency has additional information on medication disposal: https://[email or web address].[email or web address]/living-green/managing-unwanted-medications.      Many  prescription pain medications contain Tylenol  (acetaminophen), including Vicodin , Tylenol #3 , Norco , Lortab , and Percocet .  You should not take any extra pills of Tylenol  if you are using these prescription medications or you can get very sick.  Do not ever take more than 3000 mg of acetaminophen in any 24 hour period.    All opioids tend to cause constipation. Drink plenty of water and eat foods that have a lot of fiber, such as fruits, vegetables, prune juice, apple juice and high fiber cereal.  Take a laxative if you don t move your bowels at least every other day. Miralax , Milk of Magnesia, Colace , or Senna  can be used to keep you regular.        24 Hour Appointment Hotline       To make an appointment at any [facility] clinic, call [phone] ([phone]). If you don't have a family doctor or clinic, we will help you find one. [facility] clinics are conveniently located to serve the needs of you and your family.             Review of your medicines      START taking        Dose / Directions Last dose taken    HYDROcodone-acetaminophen 5-325 MG per tablet   Commonly known as:  NORCO   Dose:  1 tablet   Quantity:  10 tablet        Take 1 tablet by mouth every 6 hours as needed for severe pain   Refills:  0          Our records show that you are taking the medicines listed below. If these are incorrect, please call your family doctor or clinic.        Dose / Directions Last dose taken    ABILIFY 20 MG tablet   Dose:  10 mg   Generic drug:  ARIPiprazole        Take 0.5 tablets (10 mg) by mouth 2 times daily   Refills:  0        ALPHA LIPOIC ACID PO        Refills:  0        buPROPion 100 MG 12 hr tablet   Commonly known as:  WELLBUTRIN SR   Dose:  100 mg   Quantity:  60 tablet        Take 1 tablet (100 mg) by mouth daily   Refills:  0        cholecalciferol 1000 UNIT tablet   Commonly known as:  vitamin D3   Dose:  1000 Units   Quantity:  100 tablet        Take 1 tablet (1,000 Units) by mouth daily    Refills:  3        CULTURELLE DIGESTIVE HEALTH Caps   Dose:  1 capsule   Quantity:  90 capsule        Take 1 capsule by mouth daily   Refills:  3        fish oil-omega-3 fatty acids 1000 MG capsule   Dose:  1 capsule   Quantity:  90 capsule        Take 1 capsule by mouth daily   Refills:  3        * FLUoxetine 40 MG capsule   Commonly known as:  PROzac   Dose:  40 mg        Take 40 mg by mouth daily   Refills:  0        * FLUoxetine 10 MG capsule   Commonly known as:  PROzac   Dose:  20 mg   Quantity:  90 capsule        Take 2 capsules (20 mg) by mouth 2 times daily   Refills:  3        magnesium 250 MG tablet   Dose:  1 tablet   Quantity:  30 tablet        Take 1 tablet by mouth daily   Refills:  0        MELATONIN PO   Dose:  3 mg        Take 3 mg by mouth   Refills:  0        multivitamin, therapeutic Tabs tablet   Dose:  1 tablet        Take 1 tablet by mouth daily Reported on 2/27/2017   Refills:  0        * Notice:  This list has 2 medication(s) that are the same as other medications prescribed for you. Read the directions carefully, and ask your doctor or other care provider to review them with you.            Information about OPIOIDS     PRESCRIPTION OPIOIDS: WHAT YOU NEED TO KNOW   We gave you an opioid (narcotic) pain medicine. It is important to manage your pain, but opioids are not always the best choice. You should first try all the other options your care team gave you. Take this medicine for as short a time (and as few doses) as possible.    Some activities can increase your pain, such as bandage changes or therapy sessions. It may help to take your pain medicine 30 to 60 minutes before these activities. Reduce your stress by getting enough sleep, working on hobbies you enjoy and practicing relaxation or meditation. Talk to your care team about ways to manage your pain beyond prescription opioids.    These medicines have risks:    DO NOT drive when on new or higher doses of pain medicine. These  medicines can affect your alertness and reaction times, and you could be arrested for driving under the influence (DUI). If you need to use opioids long-term, talk to your care team about driving.    DO NOT operate heavy machinery    DO NOT do any other dangerous activities while taking these medicines.    DO NOT drink any alcohol while taking these medicines.     If the opioid prescribed includes acetaminophen, DO NOT take with any other medicines that contain acetaminophen. Read all labels carefully. Look for the word  acetaminophen  or  Tylenol.  Ask your pharmacist if you have questions or are unsure.    You can get addicted to pain medicines, especially if you have a history of addiction (chemical, alcohol or substance dependence). Talk to your care team about ways to reduce this risk.    All opioids tend to cause constipation. Drink plenty of water and eat foods that have a lot of fiber, such as fruits, vegetables, prune juice, apple juice and high-fiber cereal. Take a laxative (Miralax, milk of magnesia, Colace, Senna) if you don t move your bowels at least every other day. Other side effects include upset stomach, sleepiness, dizziness, throwing up, tolerance (needing more of the medicine to have the same effect), physical dependence and slowed breathing.    Store your pills in a secure place, locked if possible. We will not replace any lost or stolen medicine. If you don t finish your medicine, please throw away (dispose) as directed by your pharmacist. The [place] Pollution Control Agency has more information about safe disposal: https://www.pca.state.[place]/living-green/managing-unwanted-medications        Prescriptions were sent or printed at these locations (1 Prescription)                   Other Prescriptions                Printed at Department/Unit printer (1 of 1)         HYDROcodone-acetaminophen (NORCO) 5-325 MG per tablet                Procedures and tests performed during your visit     XR  Wrist Right 1 View    XR Wrist Right 3 Views      Orders Needing Specimen Collection     None      Pending Results     Date and Time Order Name Status Description    9/23/2018 1059 XR Wrist Right 1 View Preliminary     9/23/2018 1021 XR Wrist Right 3 Views Preliminary             Pending Culture Results     No orders found from 9/21/2018 to 9/24/2018.            Pending Results Instructions     If you had any lab results that were not finalized at the time of your Discharge, you can call the ED Lab Result RN at [phone]. You will be contacted by this team for any positive Lab results or changes in treatment. The nurses are available 7 days a week from 10A to 6:30P.  You can leave a message 24 hours per day and they will return your call.        Test Results From Your Hospital Stay        9/23/2018 10:57 AM      Narrative     RIGHT WRIST THREE OR MORE VIEWS  9/23/2018 10:48 AM     HISTORY:  Right wrist pain.         Impression     IMPRESSION:   1. Mildly comminuted distal radius fracture with articular extension.  There is slight displacement and mild impaction medially. A true  lateral view would be better in assessing for associated dorsal  tilting.  2. Mildly displaced ulnar styloid fracture.               9/23/2018 11:24 AM      Narrative     RIGHT WRIST ONE VIEW  9/23/2018 11:12 AM     HISTORY:  Wrist fracture.    FINDINGS: A single lateral view is compared with earlier today.\        Impression     IMPRESSION: There is mild dorsal tilting associated with the distal  radius fracture.                Clinical Quality Measure: Blood Pressure Screening     Your blood pressure was checked while you were in the emergency department today. The last reading we obtained was  BP: 126/82 . Please read the guidelines below about what these numbers mean and what you should do about them.  If your systolic blood pressure (the top number) is less than 120 and your diastolic blood pressure (the bottom number) is less than  "80, then your blood pressure is normal. There is nothing more that you need to do about it.  If your systolic blood pressure (the top number) is 120-139 or your diastolic blood pressure (the bottom number) is 80-89, your blood pressure may be higher than it should be. You should have your blood pressure rechecked within a year by a primary care provider.  If your systolic blood pressure (the top number) is 140 or greater or your diastolic blood pressure (the bottom number) is 90 or greater, you may have high blood pressure. High blood pressure is treatable, but if left untreated over time it can put you at risk for heart attack, stroke, or kidney failure. You should have your blood pressure rechecked by a primary care provider within the next 4 weeks.  If your provider in the emergency department today gave you specific instructions to follow-up with your doctor or provider even sooner than that, you should follow that instruction and not wait for up to 4 weeks for your follow-up visit.        Thank you for choosing [facility]       Thank you for choosing [facility] for your care. Our goal is always to provide you with excellent care. Hearing back from our patients is one way we can continue to improve our services. Please take a few minutes to complete the written survey that you may receive in the mail after you visit with us. Thank you!        [employer] Information     [employer] lets you send messages to your doctor, view your test results, renew your prescriptions, schedule appointments and more. To sign up, go to [email or web address] . Click on \"Log in\" on the left side of the screen, which will take you to the Welcome page. Then click on \"Sign up Now\" on the right side of the page.     You will be asked to enter the access code listed below, as well as some personal information. Please follow the directions to create your username and password.     Your access code is: [email or web address]  Expires: 12/22/2018 12:06 PM   "   Your access code will  in 90 days. If you need help or a new code, please call your [facility] clinic or [phone].        Care EveryWhere ID     This is your Care EveryWhere ID. This could be used by other organizations to access your [facility] medical records  [id number]        Equal Access to Services     DENICE MACKENZIE : Marce larseno Sokurtis, waaxda luqadaha, qaybta kaalmada jennifer, nicole gallegos. So [facility] [phone].    ATENCIÓN: Si habla español, tiene a murry disposición servicios gratuitos de asistencia lingüística. Llame al [phone].    We comply with applicable federal civil rights laws and [place] laws. We do not discriminate on the basis of race, color, national origin, age, disability, sex, sexual orientation, or gender identity.            After Visit Summary       This is your record. Keep this with you and show to your community pharmacist(s) and doctor(s) at your next visit.

## 2018-09-23 NOTE — ED PROVIDER NOTES
"  History     Chief Complaint:  Wrist Pain     HPI   Hamilton Hastings is a right-handed 23 year old male who presents to the emergency department today for evaluation of wrist pain. Patient reports that while playing softball yesterday he fell onto his right wrist and now has some swelling and pain in his wrist and some of his fingers. He has taken tylenol and ibuprofen for pain. He denies shoulder, elbow, leg, or back pain, and has no numbness in his fingers.     Allergies:  Flu Virus Vaccine  Haemophilus Influenzae   Sulfa Drugs      Medications:    Abilify   Wellbutrin   Prozac     Past Medical History:    Autism  Traumatic brain injury     Past Surgical History:    Surgical history reviewed. No pertinent surgical history.     Family History:    No family history of diabetes, coronary artery disease, hypertension, hyperlipidemia, cerebrovascular disease, breast cancer, colon cancer, prostate cancer, depression, anxiety disorder, mental illness, substance abuse, anesthesia reaction, asthma, osteoporosis, genetic disorder, thyroid disease, or obesity.     Social History:  The patient was accompanied to the ED by mom.  Smoking Status: Never Smoker  Smokeless Tobacco: Never Used  Alcohol Use: Negative    Marital Status: Single      Review of Systems   Musculoskeletal:        Right wrist pain and swelling. Some finger pain.  No leg, shoulder, elbow, or back pain.    Neurological: Negative for numbness.   All other systems reviewed and are negative.    Physical Exam     Patient Vitals for the past 24 hrs:   BP Temp Temp src Heart Rate Resp SpO2 Height Weight   09/23/18 1012 114/77 98  F (36.7  C) Oral 91 18 96 % 1.905 m (6' 3\") 99.8 kg (220 lb)     Physical Exam  General: Alert, interactive in mild distress  Head:  Scalp is atraumatic  Eyes:  The pupils are equal, round, and reactive to light    EOM's intact    No scleral icterus  ENT:      Nose:  The external nose is normal  Ears:  External ears are " normal  Mouth/Throat: The oropharynx is normal    Mucus membranes are moist       Neck:  Normal range of motion.      There is no rigidity.    Trachea is in the midline         CV:  Regular rate and rhythm    No murmur   Resp:  Breath sounds are clear bilaterally    Non-labored, no retractions or accessory muscle use      MS:  Tenderness and edema over right wrist. No tenderness in anatomical snuff box. Limited range of motion of wrist secondary to pain.   Skin:  Warm and dry, No rash or lesions noted.  Neuro: Strength 5/5 x4.  Sensation intact  In all 4 extremities.      GCS: 15  Psych:  Awake. Alert.  Normal affect.      Appropriate interactions.    Emergency Department Course     Imaging:  Radiology findings were communicated with the patient who voiced understanding of the findings.    XR Wrist Right 1 View  There is mild dorsal tilting associated with the distal  radius fracture.  Reading per radiology     XR Wrist Right 3 Views  1. Mildly comminuted distal radius fracture with articular extension.  There is slight displacement and mild impaction medially. A true  lateral view would be better in assessing for associated dorsal  tilting.  2. Mildly displaced ulnar styloid fracture.   Reading per radiology     Procedures:  Custom plaster sugar-tong splint was applied to the right wrist and after placement I checked and adjusted the fit to ensure proper positioning.  The patient was more comfortable with the splint in place.  Sensation and circulation are intact after splint placement.    Emergency Department Course:    1015 Nursing notes and vitals reviewed.    1023 I performed an exam of the patient as documented above.     1044 The patient was sent for a XR while in the emergency department, results above.     1103 The patient was sent for a XR while in the emergency department, results above.     1128 Recheck and update. Splint placed as noted above.     1204 I personally reviewed the imaging results with the  patient and answered all related questions prior to discharge.    Impression & Plan      Medical Decision Making:  Hamilton Hastings is a 23 year old male who was seen and evaluated. The above workup was undertaken demonstrating a mildly displaced comminuted distal radius fracture. I do not believe this needs closed reduction as the angulation is quite minimal. Given the intraarticular nature the patient may require operative intervention. He was placed in a plaster sugar-tong splint by the ED technician. This was checked by me and was appropriately fitted. He felt better with the splint in place. He will follow up with [facility] and return if new symptoms develop. Patient and mother were in agreement with this plan. There is no sign of secondary injury. He was prescribed Norco for breakthrough pain.     Diagnosis:    ICD-10-CM    1. Fracture of wrist, right, closed, initial encounter S62.101A      Disposition:   The patient is discharged to home.    Discharge Medications:  New Prescriptions    HYDROCODONE-ACETAMINOPHEN (NORCO) 5-325 MG PER TABLET    Take 1 tablet by mouth every 6 hours as needed for severe pain     Scribe Disclosure:  Estephania GARDNER, am serving as a scribe at 10:16 AM on 9/23/2018 to document services personally performed by Brendon Delarosa, based on my observations and the provider's statements to me.       EMERGENCY DEPARTMENT       Brendon Delarosa MD  09/23/18 7744

## 2018-09-25 ENCOUNTER — TRANSFERRED RECORDS (OUTPATIENT)
Dept: HEALTH INFORMATION MANAGEMENT | Facility: CLINIC | Age: 23
End: 2018-09-25

## 2018-10-25 ENCOUNTER — TRANSFERRED RECORDS (OUTPATIENT)
Dept: HEALTH INFORMATION MANAGEMENT | Facility: CLINIC | Age: 23
End: 2018-10-25

## 2018-10-29 ENCOUNTER — COMMUNICATION - HEALTHEAST (OUTPATIENT)
Dept: NEUROLOGY | Facility: CLINIC | Age: 23
End: 2018-10-29

## 2018-10-29 DIAGNOSIS — F06.30 MOOD DISORDER IN CONDITIONS CLASSIFIED ELSEWHERE: ICD-10-CM

## 2018-10-30 ENCOUNTER — HOSPITAL ENCOUNTER (OUTPATIENT)
Dept: NEUROLOGY | Facility: CLINIC | Age: 23
Setting detail: THERAPIES SERIES
Discharge: STILL ADMITTED OR ADMINISTRATIVE | End: 2018-10-30
Attending: NURSE PRACTITIONER

## 2018-10-30 DIAGNOSIS — F84.0 AUTISM: ICD-10-CM

## 2019-01-28 ENCOUNTER — OFFICE VISIT (OUTPATIENT)
Dept: URGENT CARE | Facility: URGENT CARE | Age: 24
End: 2019-01-28
Payer: COMMERCIAL

## 2019-01-28 VITALS
HEART RATE: 89 BPM | OXYGEN SATURATION: 95 % | DIASTOLIC BLOOD PRESSURE: 78 MMHG | TEMPERATURE: 97.6 F | WEIGHT: 223 LBS | BODY MASS INDEX: 27.87 KG/M2 | SYSTOLIC BLOOD PRESSURE: 106 MMHG
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
## 2019-01-28 DIAGNOSIS — S09.90XA INJURY OF HEAD, INITIAL ENCOUNTER: ICD-10-CM

## 2019-01-28 DIAGNOSIS — H61.22 IMPACTED CERUMEN OF LEFT EAR: ICD-10-CM

## 2019-01-28 DIAGNOSIS — S06.9X0D TRAUMATIC BRAIN INJURY, WITHOUT LOSS OF CONSCIOUSNESS, SUBSEQUENT ENCOUNTER: Primary | ICD-10-CM

## 2019-01-28 PROCEDURE — 99213 OFFICE O/P EST LOW 20 MIN: CPT | Mod: 25 | Performed by: PHYSICIAN ASSISTANT

## 2019-01-28 PROCEDURE — 69210 REMOVE IMPACTED EAR WAX UNI: CPT | Mod: LT | Performed by: PHYSICIAN ASSISTANT

## 2019-01-28 RX ORDER — CITALOPRAM HYDROBROMIDE 40 MG/1
40 TABLET ORAL DAILY
COMMUNITY
End: 2022-02-15

## 2019-01-28 ASSESSMENT — ENCOUNTER SYMPTOMS
EYE REDNESS: 0
HEMATURIA: 0
DIARRHEA: 0
DIAPHORESIS: 0
HEADACHES: 0
WHEEZING: 0
RHINORRHEA: 0
WEAKNESS: 0
DIZZINESS: 0
MYALGIAS: 0
CARDIOVASCULAR NEGATIVE: 1
COUGH: 0
NAUSEA: 0
ADENOPATHY: 0
EYE DISCHARGE: 0
MUSCULOSKELETAL NEGATIVE: 1
CONSTITUTIONAL NEGATIVE: 1
EYE ITCHING: 0
GASTROINTESTINAL NEGATIVE: 1
RESPIRATORY NEGATIVE: 1
SORE THROAT: 0
FEVER: 0
POLYDIPSIA: 0
PALPITATIONS: 0
NEUROLOGICAL NEGATIVE: 1
VOMITING: 0
FREQUENCY: 0
LIGHT-HEADEDNESS: 0
ENDOCRINE NEGATIVE: 1
SHORTNESS OF BREATH: 0
CHEST TIGHTNESS: 0
DYSURIA: 0
EYES NEGATIVE: 1
CHILLS: 0
ABDOMINAL PAIN: 0

## 2019-01-28 ASSESSMENT — PAIN SCALES - GENERAL: PAINLEVEL: NO PAIN (0)
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
## 2019-01-29 ENCOUNTER — TELEPHONE (OUTPATIENT)
Dept: FAMILY MEDICINE | Facility: CLINIC | Age: 24
End: 2019-01-29

## 2019-01-29 NOTE — TELEPHONE ENCOUNTER
Our goal is to have forms completed within 72 hours, however some forms may require a visit or additional information.    What clinic location was the form placed at [facility] or [place].?     Who is the form from?   Where did the form come from? Faxed to clinic   The form was placed in the inbox of Ayesha Davis MD      Please fax to [phone] & [phone]  Phone number: [phone]    Additional comments: Hammer medication and diet review     Call take on 1/29/2019 at 3:25 PM by Serene Bergman

## 2019-02-14 ENCOUNTER — TELEPHONE (OUTPATIENT)
Dept: FAMILY MEDICINE | Facility: CLINIC | Age: 24
End: 2019-02-14

## 2019-02-14 NOTE — TELEPHONE ENCOUNTER
Our goal is to have forms completed within 72 hours, however some forms may require a visit or additional information.    What clinic location was the form placed at [facility] or [place].?     Who is the form from?   Where did the form come from? Faxed to clinic   The form was placed in the inbox of Garrett Jama MD      Please fax to [phone]  Phone number: [phone]    Additional comments: [facility] review    Call take on 2/14/2019 at 1:45 PM by Serene Bergman

## 2019-02-18 ENCOUNTER — COMMUNICATION - HEALTHEAST (OUTPATIENT)
Dept: NEUROLOGY | Facility: CLINIC | Age: 24
End: 2019-02-18

## 2019-02-18 DIAGNOSIS — F06.30 MOOD DISORDER IN CONDITIONS CLASSIFIED ELSEWHERE: ICD-10-CM

## 2019-04-10 ENCOUNTER — COMMUNICATION - HEALTHEAST (OUTPATIENT)
Dept: NEUROLOGY | Facility: CLINIC | Age: 24
End: 2019-04-10

## 2019-04-10 DIAGNOSIS — F06.30 MOOD DISORDER IN CONDITIONS CLASSIFIED ELSEWHERE: ICD-10-CM

## 2019-04-11 ENCOUNTER — COMMUNICATION - HEALTHEAST (OUTPATIENT)
Dept: NEUROLOGY | Facility: CLINIC | Age: 24
End: 2019-04-11

## 2019-04-11 DIAGNOSIS — F06.30 MOOD DISORDER IN CONDITIONS CLASSIFIED ELSEWHERE: ICD-10-CM

## 2019-05-14 ENCOUNTER — HOSPITAL ENCOUNTER (OUTPATIENT)
Dept: NEUROLOGY | Facility: CLINIC | Age: 24
Setting detail: THERAPIES SERIES
Discharge: STILL ADMITTED OR ADMINISTRATIVE | End: 2019-05-14
Attending: PSYCHIATRY & NEUROLOGY

## 2019-05-14 DIAGNOSIS — F06.30 MOOD DISORDER IN CONDITIONS CLASSIFIED ELSEWHERE: ICD-10-CM

## 2019-06-14 ENCOUNTER — OFFICE VISIT (OUTPATIENT)
Dept: FAMILY MEDICINE | Facility: CLINIC | Age: 24
End: 2019-06-14
Payer: COMMERCIAL

## 2019-06-14 VITALS
DIASTOLIC BLOOD PRESSURE: 60 MMHG | BODY MASS INDEX: 27.85 KG/M2 | RESPIRATION RATE: 18 BRPM | WEIGHT: 224 LBS | HEIGHT: 75 IN | SYSTOLIC BLOOD PRESSURE: 100 MMHG | TEMPERATURE: 97.6 F | OXYGEN SATURATION: 97 % | HEART RATE: 83 BPM

## 2019-06-14 DIAGNOSIS — F32.5 MAJOR DEPRESSION IN COMPLETE REMISSION (H): ICD-10-CM

## 2019-06-14 DIAGNOSIS — Z00.00 ROUTINE GENERAL MEDICAL EXAMINATION AT A HEALTH CARE FACILITY: Primary | ICD-10-CM

## 2019-06-14 DIAGNOSIS — F84.0 AUTISM: ICD-10-CM

## 2019-06-14 DIAGNOSIS — E66.3 OVERWEIGHT (BMI 25.0-29.9): ICD-10-CM

## 2019-06-14 DIAGNOSIS — S06.9X0D TRAUMATIC BRAIN INJURY, WITHOUT LOSS OF CONSCIOUSNESS, SUBSEQUENT ENCOUNTER: ICD-10-CM

## 2019-06-14 DIAGNOSIS — F39 EPISODIC MOOD DISORDER (H): ICD-10-CM

## 2019-06-14 PROCEDURE — 99395 PREV VISIT EST AGE 18-39: CPT | Performed by: FAMILY MEDICINE

## 2019-06-14 ASSESSMENT — ANXIETY QUESTIONNAIRES
3. WORRYING TOO MUCH ABOUT DIFFERENT THINGS: NOT AT ALL
2. NOT BEING ABLE TO STOP OR CONTROL WORRYING: NOT AT ALL
6. BECOMING EASILY ANNOYED OR IRRITABLE: NOT AT ALL
IF YOU CHECKED OFF ANY PROBLEMS ON THIS QUESTIONNAIRE, HOW DIFFICULT HAVE THESE PROBLEMS MADE IT FOR YOU TO DO YOUR WORK, TAKE CARE OF THINGS AT HOME, OR GET ALONG WITH OTHER PEOPLE: NOT DIFFICULT AT ALL
7. FEELING AFRAID AS IF SOMETHING AWFUL MIGHT HAPPEN: NOT AT ALL
GAD7 TOTAL SCORE: 0
1. FEELING NERVOUS, ANXIOUS, OR ON EDGE: NOT AT ALL
5. BEING SO RESTLESS THAT IT IS HARD TO SIT STILL: NOT AT ALL

## 2019-06-14 ASSESSMENT — PATIENT HEALTH QUESTIONNAIRE - PHQ9
SUM OF ALL RESPONSES TO PHQ QUESTIONS 1-9: 3
5. POOR APPETITE OR OVEREATING: NOT AT ALL

## 2019-06-14 ASSESSMENT — MIFFLIN-ST. JEOR: SCORE: 2091.69

## 2019-06-14 NOTE — NURSING NOTE
"Chief Complaint   Patient presents with     Physical     /60   Pulse 83   Temp 97.6  F (36.4  C) (Tympanic)   Resp 18   Ht 1.905 m (6' 3\")   Wt 101.6 kg (224 lb)   SpO2 97%   BMI 28.00 kg/m   Estimated body mass index is 28 kg/m  as calculated from the following:    Height as of this encounter: 1.905 m (6' 3\").    Weight as of this encounter: 101.6 kg (224 lb).  BP completed using cuff size: cinthya Fowler CMA    Health Maintenance Due   Topic Date Due     HIV SCREENING  05/10/2010     DTAP/TDAP/TD IMMUNIZATION (2 - Td) 04/29/2012     PHQ-9  09/16/2018     Health Maintenance reviewed at today's visit patient asked to schedule/complete:   Depression:  Patient agrees to schedule  Immunizations:  Patient agrees to schedule    "

## 2019-06-14 NOTE — PATIENT INSTRUCTIONS
Preventive Health Recommendations  Male Ages 21 - 25     Yearly exam:             See your health care provider every year in order to  o   Review health changes.   o   Discuss preventive care.    o   Review your medicines if your doctor has prescribed any.    You should be tested each year for STDs (sexually transmitted diseases).     Talk to your provider about cholesterol testing.      If you are at risk for diabetes, you should have a diabetes test (fasting glucose).    Shots: Get a flu shot each year. Get a tetanus shot every 10 years.     Nutrition:    Eat at least 5 servings of fruits and vegetables daily.     Eat whole-grain bread, whole-wheat pasta and brown rice instead of white grains and rice.     Get adequate calcium and Vitamin D.     Lifestyle    Exercise for at least 150 minutes a week (30 minutes a day, 5 days a week). This will help you control your weight and prevent disease.     Limit alcohol to one drink per day.     No smoking.     Wear sunscreen to prevent skin cancer.     See your dentist every six months for an exam and cleaning.     1. Please do your breast exam every mo, when you  Change the  calendar page or set an alarm on your cell phone Do a  visual check for dimples, inversion or indentation or any different position of the nipple Feel manually  for any 1cm or larger  size mass ie about the size of an almond Be sure to cover the entire area of both breasts : this extends back to the back on either side and from the collar bone to the bottom of the breasts where you can begin to feel ribs.  Men are advised to do this exam also as they now have a higher rate of breast cancer , like women do .   2.  Weight Loss Tips  1. Do not eat after 6 hrs before your expected bedtime  2. Have your heaviest meal for breakfast, a slightly lighter meal at lunch and a snack 6 hrs before bed  3. No sugar/calorie drinks except milk ie no fruit juice, pop, alcohol.  4. Drink milk 30min before meals to  decrease your hunger. Also it is excellent as part of your last meal of the day snack  5. Drink lots of water  6. Increase fiber in diet: all bran cereal, salads, popcorn etc  7. Have only one small serving of fruit a day about 1/2 cup (as this is high in sugar)  8. EXERCISE is the bottom line. Without it, you will gain weight even on a low calorie diet. Best if done 2-3X a day as can    Being overweight contributes to high blood pressure and high cholesterol, both of which cause heart attacks, strokes and kidney failure, prediabetes and diabetes, arthritis, and liver disease

## 2019-06-14 NOTE — LETTER
My Depression Action Plan  Name: Hamilton Hastings   Date of Birth 1995  Date: 6/14/2019    My doctor: Ayesha Davis   My clinic: [address] [address]  [phone]          GREEN    ZONE   Good Control    What it looks like:     Things are going generally well. You have normal up s and down s. You may even feel depressed from time to time, but bad moods usually last less than a day.   What you need to do:  1. Continue to care for yourself (see self care plan)  2. Check your depression survival kit and update it as needed  3. Follow your physician s recommendations including any medication.  4. Do not stop taking medication unless you consult with your physician first.           YELLOW         ZONE Getting Worse    What it looks like:     Depression is starting to interfere with your life.     It may be hard to get out of bed; you may be starting to isolate yourself from others.    Symptoms of depression are starting to last most all day and this has happened for several days.     You may have suicidal thoughts but they are not constant.   What you need to do:     1. Call your care team, your response to treatment will improve if you keep your care team informed of your progress. Yellow periods are signs an adjustment may need to be made.     2. Continue your self-care, even if you have to fake it!    3. Talk to someone in your support network    4. Open up your depression survival kit           RED    ZONE Medical Alert - Get Help    What it looks like:     Depression is seriously interfering with your life.     You may experience these or other symptoms: You can t get out of bed most days, can t work or engage in other necessary activities, you have trouble taking care of basic hygiene, or basic responsibilities, thoughts of suicide or death that will not go away, self-injurious behavior.     What  you need to do:  1. Call your care team and request a same-day appointment. If they are not available (weekends or after hours) call your local crisis line, emergency room or 911.            Depression Self Care Plan / Survival Kit    Self-Care for Depression  Here s the deal. Your body and mind are really not as separate as most people think.  What you do and think affects how you feel and how you feel influences what you do and think. This means if you do things that people who feel good do, it will help you feel better.  Sometimes this is all it takes.  There is also a place for medication and therapy depending on how severe your depression is, so be sure to consult with your medical provider and/ or Behavioral Health Consultant if your symptoms are worsening or not improving.     In order to better manage my stress, I will:    Exercise  Get some form of exercise, every day. This will help reduce pain and release endorphins, the  feel good  chemicals in your brain. This is almost as good as taking antidepressants!  This is not the same as joining a gym and then never going! (they count on that by the way ) It can be as simple as just going for a walk or doing some gardening, anything that will get you moving.      Hygiene   Maintain good hygiene (Get out of bed in the morning, Make your bed, Brush your teeth, Take a shower, and Get dressed like you were going to work, even if you are unemployed).  If your clothes don't fit try to get ones that do.    Diet  I will strive to eat foods that are good for me, drink plenty of water, and avoid excessive sugar, caffeine, alcohol, and other mood-altering substances.  Some foods that are helpful in depression are: complex carbohydrates, B vitamins, flaxseed, fish or fish oil, fresh fruits and vegetables.    Psychotherapy  I agree to participate in Individual Therapy (if recommended).    Medication  If prescribed medications, I agree to take them.  Missing doses can result  in serious side effects.  I understand that drinking alcohol, or other illicit drug use, may cause potential side effects.  I will not stop my medication abruptly without first discussing it with my provider.    Staying Connected With Others  I will stay in touch with my friends, family members, and my primary care provider/team.    Use your imagination  Be creative.  We all have a creative side; it doesn t matter if it s oil painting, sand castles, or mud pies! This will also kick up the endorphins.    Witness Beauty  (AKA stop and smell the roses) Take a look outside, even in mid-winter. Notice colors, textures. Watch the squirrels and birds.     Service to others  Be of service to others.  There is always someone else in need.  By helping others we can  get out of ourselves  and remember the really important things.  This also provides opportunities for practicing all the other parts of the program.    Humor  Laugh and be silly!  Adjust your TV habits for less news and crime-drama and more comedy.    Control your stress  Try breathing deep, massage therapy, biofeedback, and meditation. Find time to relax each day.     My support system    Clinic Contact:  Phone number:    Contact 1:  Phone number:    Contact 2:  Phone number:    Gnosticism/:  Phone number:    Therapist:  Phone number:    Local crisis center:    Phone number:    Other community support:  Phone number:

## 2019-06-14 NOTE — PROGRESS NOTES
SUBJECTIVE:   CC: Hamilton Hastings is an 24 year old male who presents for preventative health visit.     Healthy Habits:     Getting at least 3 servings of Calcium per day:  Yes    Bi-annual eye exam:  Yes    Dental care twice a year:  Yes    Sleep apnea or symptoms of sleep apnea:  None    Diet:  Regular (no restrictions)    Frequency of exercise:  1 day/week    Duration of exercise:  15-30 minutes    Taking medications regularly:  Yes    Barriers to taking medications:  None    Medication side effects:  None    PHQ-2 Total Score: 1    Additional concerns today:  No        Depression and Anxiety /Autism / TBI      How are you doing with your depression since your last visit? No change-in remission    How are you doing with your anxiety since your last visit?  No change    Are you having other symptoms that might be associated with depression or anxiety? No    Have you had a significant life event? No     Do you have any concerns with your use of alcohol or other drugs? No    Social History     Tobacco Use     Smoking status: Never Smoker     Smokeless tobacco: Never Used   Substance Use Topics     Alcohol use: No     Alcohol/week: 0.0 oz     Drug use: No     PHQ 3/9/2017 3/16/2018 6/14/2019   PHQ-9 Total Score 5 1 3   Q9: Thoughts of better off dead/self-harm past 2 weeks Not at all Not at all Not at all     MAXIMILIANO-7 SCORE 3/16/2018 6/14/2019   Total Score 2 0       Suicide Assessment Five-step Evaluation and Treatment (SAFE-T)    Today's PHQ-2 Score:   PHQ-2 ( 1999 [employer]) 6/14/2019   Q1: Little interest or pleasure in doing things 0   Q2: Feeling down, depressed or hopeless 1   PHQ-2 Score 1   Q1: Little interest or pleasure in doing things Not at all   Q2: Feeling down, depressed or hopeless Several days   PHQ-2 Score 1       Abuse: Current or Past(Physical, Sexual or Emotional)- No  Do you feel safe in your environment? Yes      OVERWEIGHT    -BMI =- 26.8  Social History     Tobacco Use     Smoking status:  "Never Smoker     Smokeless tobacco: Never Used   Substance Use Topics     Alcohol use: No     Alcohol/week: 0.0 oz     Alcohol Use 6/14/2019   Prescreen: >3 drinks/day or >7 drinks/week? Not Applicable   Prescreen: >3 drinks/day or >7 drinks/week? -   No flowsheet data found.    Last PSA: No results found for: PSA    Reviewed orders with patient. Reviewed health maintenance and updated orders accordingly - Yes  Labs reviewed in EPIC    Reviewed and updated as needed this visit by clinical staff  Tobacco  Allergies  Meds  Problems  Med Hx  Surg Hx  Fam Hx  Soc Hx          Reviewed and updated as needed this visit by Provider  Tobacco  Allergies  Meds  Problems  Med Hx  Surg Hx  Fam Hx            Review of Systems  CONSTITUTIONAL: NEGATIVE for fever, chills, change in weight  INTEGUMENTARY/SKIN: NEGATIVE for worrisome rashes, moles or lesions  EYES: NEGATIVE for vision changes or irritation  ENT: NEGATIVE for ear, mouth and throat problems  RESP: NEGATIVE for significant cough or SOB  CV: NEGATIVE for chest pain, palpitations or peripheral edema  GI: NEGATIVE for nausea, abdominal pain, heartburn, or change in bowel habits   male: negative for dysuria, hematuria, decreased urinary stream, erectile dysfunction, urethral discharge  MUSCULOSKELETAL: NEGATIVE for significant arthralgias or myalgia  NEURO: NEGATIVE for weakness, dizziness or paresthesias  PSYCHIATRIC: NEGATIVE for changes in mood or affect    OBJECTIVE:   /60   Pulse 83   Temp 97.6  F (36.4  C) (Tympanic)   Resp 18   Ht 1.905 m (6' 3\")   Wt 101.6 kg (224 lb)   SpO2 97%   BMI 28.00 kg/m      Physical Exam  GENERAL: healthy, alert, no distress and over weight  EYES: Eyes grossly normal to inspection, PERRL and conjunctivae and sclerae normal  NECK: no adenopathy, no asymmetry, masses, or scars and thyroid normal to palpation  RESP: lungs clear to auscultation - no rales, rhonchi or wheezes  CV: regular rate and rhythm, normal S1 " "S2, no S3 or S4, no murmur, click or rub, no peripheral edema and peripheral pulses strong  ABDOMEN: soft, nontender, no hepatosplenomegaly, no masses and bowel sounds normal  MS: no gross musculoskeletal defects noted, no edema  SKIN: no suspicious lesions or rashes  NEURO: Normal strength and tone, mentation intact and speech normal  PSYCH: mentation appears normal, concentration poor, inattentive, tangential, affect normal/bright and appearance well groomed        ASSESSMENT/PLAN:       ICD-10-CM    1. Routine general medical examination at a health care facility Z00.00    2. Major depression in complete remission (H) F32.5    3. Episodic mood disorder (H) F39    4. Traumatic brain injury, without loss of consciousness, subsequent encounter S06.9X0D    5. Autism F84.0    6. Overweight (BMI 25.0-29.9) E66.3        COUNSELING:   Reviewed preventive health counseling, as reflected in patient instructions       Regular exercise       Healthy diet/nutrition       Vision screening    Estimated body mass index is 28 kg/m  as calculated from the following:    Height as of this encounter: 1.905 m (6' 3\").    Weight as of this encounter: 101.6 kg (224 lb).     Weight management plan: Discussed healthy diet and exercise guidelines     reports that he has never smoked. He has never used smokeless tobacco.   Patient Instructions     Preventive Health Recommendations  Male Ages 21 - 25     Yearly exam:             See your health care provider every year in order to  o   Review health changes.   o   Discuss preventive care.    o   Review your medicines if your doctor has prescribed any.    You should be tested each year for STDs (sexually transmitted diseases).     Talk to your provider about cholesterol testing.      If you are at risk for diabetes, you should have a diabetes test (fasting glucose).    Shots: Get a flu shot each year. Get a tetanus shot every 10 years.     Nutrition:    Eat at least 5 servings of fruits and " vegetables daily.     Eat whole-grain bread, whole-wheat pasta and brown rice instead of white grains and rice.     Get adequate calcium and Vitamin D.     Lifestyle    Exercise for at least 150 minutes a week (30 minutes a day, 5 days a week). This will help you control your weight and prevent disease.     Limit alcohol to one drink per day.     No smoking.     Wear sunscreen to prevent skin cancer.     See your dentist every six months for an exam and cleaning.     1. Please do your breast exam every mo, when you  Change the  calendar page or set an alarm on your cell phone Do a  visual check for dimples, inversion or indentation or any different position of the nipple Feel manually  for any 1cm or larger  size mass ie about the size of an almond Be sure to cover the entire area of both breasts : this extends back to the back on either side and from the collar bone to the bottom of the breasts where you can begin to feel ribs.  Men are advised to do this exam also as they now have a higher rate of breast cancer , like women do .   2.  Weight Loss Tips  1. Do not eat after 6 hrs before your expected bedtime  2. Have your heaviest meal for breakfast, a slightly lighter meal at lunch and a snack 6 hrs before bed  3. No sugar/calorie drinks except milk ie no fruit juice, pop, alcohol.  4. Drink milk 30min before meals to decrease your hunger. Also it is excellent as part of your last meal of the day snack  5. Drink lots of water  6. Increase fiber in diet: all bran cereal, salads, popcorn etc  7. Have only one small serving of fruit a day about 1/2 cup (as this is high in sugar)  8. EXERCISE is the bottom line. Without it, you will gain weight even on a low calorie diet. Best if done 2-3X a day as can    Being overweight contributes to high blood pressure and high cholesterol, both of which cause heart attacks, strokes and kidney failure, prediabetes and diabetes, arthritis, and liver disease               Counseling  Resources:  ATP IV Guidelines  Pooled Cohorts Equation Calculator  FRAX Risk Assessment  ICSI Preventive Guidelines  Dietary Guidelines for Americans, 2010  USDA's MyPlate  ASA Prophylaxis  Lung CA Screening    Ayesha Davis MD  [facility]

## 2019-06-15 ASSESSMENT — ANXIETY QUESTIONNAIRES: GAD7 TOTAL SCORE: 0

## 2019-08-01 ENCOUNTER — COMMUNICATION - HEALTHEAST (OUTPATIENT)
Dept: NEUROLOGY | Facility: CLINIC | Age: 24
End: 2019-08-01

## 2019-08-01 DIAGNOSIS — F06.30 MOOD DISORDER IN CONDITIONS CLASSIFIED ELSEWHERE: ICD-10-CM

## 2019-09-16 ENCOUNTER — COMMUNICATION - HEALTHEAST (OUTPATIENT)
Dept: NEUROLOGY | Facility: CLINIC | Age: 24
End: 2019-09-16

## 2019-09-16 DIAGNOSIS — F39 EPISODIC MOOD DISORDER (H): ICD-10-CM

## 2019-10-10 ENCOUNTER — OFFICE VISIT (OUTPATIENT)
Dept: FAMILY MEDICINE | Facility: CLINIC | Age: 24
End: 2019-10-10
Payer: COMMERCIAL

## 2019-10-10 VITALS
OXYGEN SATURATION: 98 % | TEMPERATURE: 98.1 F | SYSTOLIC BLOOD PRESSURE: 100 MMHG | BODY MASS INDEX: 28.6 KG/M2 | HEIGHT: 75 IN | DIASTOLIC BLOOD PRESSURE: 70 MMHG | WEIGHT: 230 LBS | RESPIRATION RATE: 18 BRPM | HEART RATE: 83 BPM

## 2019-10-10 DIAGNOSIS — E66.3 OVERWEIGHT (BMI 25.0-29.9): ICD-10-CM

## 2019-10-10 DIAGNOSIS — F32.5 MAJOR DEPRESSION IN COMPLETE REMISSION (H): ICD-10-CM

## 2019-10-10 DIAGNOSIS — S06.9X0D TRAUMATIC BRAIN INJURY, WITHOUT LOSS OF CONSCIOUSNESS, SUBSEQUENT ENCOUNTER: Primary | ICD-10-CM

## 2019-10-10 DIAGNOSIS — F84.0 AUTISM: ICD-10-CM

## 2019-10-10 PROCEDURE — 99213 OFFICE O/P EST LOW 20 MIN: CPT | Performed by: FAMILY MEDICINE

## 2019-10-10 ASSESSMENT — MIFFLIN-ST. JEOR: SCORE: 2118.9

## 2019-10-10 NOTE — NURSING NOTE
"Chief Complaint   Patient presents with     Referral     Recheck Medication     /70   Pulse 83   Temp 98.1  F (36.7  C) (Tympanic)   Resp 18   Ht 1.905 m (6' 3\")   Wt 104.3 kg (230 lb)   SpO2 98%   BMI 28.75 kg/m   Estimated body mass index is 28.75 kg/m  as calculated from the following:    Height as of this encounter: 1.905 m (6' 3\").    Weight as of this encounter: 104.3 kg (230 lb).  BP completed using cuff size: cinthya Fowler CMA    Health Maintenance Due   Topic Date Due     HIV SCREENING  05/10/2010     DTAP/TDAP/TD IMMUNIZATION (2 - Td) 04/29/2012     INFLUENZA VACCINE (1) 09/01/2019     Health Maintenance reviewed at today's visit patient asked to schedule/complete:   Immunizations:  Patient agrees to schedule    "

## 2019-10-10 NOTE — PROGRESS NOTES
Subjective     Hamilton Hastings is a 24 year old male who presents to clinic today for the following health issues:  With group home assistant & mother on phone     HPI     Referral Request    Depression and Anxiety /Autism / TBI      How are you doing with your depression since your last visit? No change-in remission    How are you doing with your anxiety since your last visit?  No change    Are you having other symptoms that might be associated with depression or anxiety? No    Have you had a significant life event? No     Do you have any concerns with your use of alcohol or other drugs? No    Sees psychiatrist , Dr Rashid, for yrs and likes him but he is to retire in a few mos     Also wants a therapist     Never went thru a TBI program     Social History     Tobacco Use     Smoking status: Never Smoker     Smokeless tobacco: Never Used   Substance Use Topics     Alcohol use: No     Alcohol/week: 0.0 oz     Drug use: No     PHQ 3/9/2017 3/16/2018 6/14/2019   PHQ-9 Total Score 5 1 3   Q9: Thoughts of better off dead/self-harm past 2 weeks Not at all Not at all Not at all     MAXIMILIANO-7 SCORE 3/16/2018 6/14/2019   Total Score 2 0   Suicide Assessment Five-step Evaluation and Treatment (SAFE-T)      How many servings of fruits and vegetables do you eat daily?  2-3    On average, how many sweetened beverages do you drink each day (soda, juice, sweet tea, etc)?   0    How many days per week do you miss taking your medication? 0              Reviewed and updated as needed this visit by Provider  Tobacco  Allergies  Meds  Problems  Med Hx  Surg Hx  Fam Hx         Review of Systems   ROS COMP: CONSTITUTIONAL: NEGATIVE for fever, chills, change in weight  INTEGUMENTARY/SKIN: NEGATIVE for worrisome rashes, moles or lesions  EYES: NEGATIVE for vision changes or irritation  ENT/MOUTH: NEGATIVE for ear, mouth and throat problems  RESP: NEGATIVE for significant cough or SOB  BREAST: NEGATIVE for masses, tenderness or  "discharge  CV: NEGATIVE for chest pain, palpitations or peripheral edema  GI: NEGATIVE for nausea, abdominal pain, heartburn, or change in bowel habits  : NEGATIVE for frequency, dysuria, or hematuria  MUSCULOSKELETAL: NEGATIVE for significant arthralgias or myalgia  NEURO: NEGATIVE for weakness, dizziness or paresthesias  ENDOCRINE: NEGATIVE for temperature intolerance, skin/hair changes  HEME: NEGATIVE for bleeding problems  PSYCHIATRIC: POSITIVE for, agitation, anxiety, concentration difficulty, HX anxiety, HX depression, impaired memory, obsessive thoughts, psychomotor agitation and weight gain      Objective    /70   Pulse 83   Temp 98.1  F (36.7  C) (Tympanic)   Resp 18   Ht 1.905 m (6' 3\")   Wt 104.3 kg (230 lb)   SpO2 98%   BMI 28.75 kg/m    Body mass index is 28.75 kg/m .  Physical Exam   GENERAL: healthy, alert, no distress and over weight  EYES: Eyes grossly normal to inspection, PERRL and conjunctivae and sclerae normal  RESP: lungs clear to auscultation - no rales, rhonchi or wheezes  MS: no gross musculoskeletal defects noted, no edema  SKIN: no suspicious lesions or rashes  NEURO: Normal strength and tone, mentation intact and speech normal  PSYCH: concentration poor, inattentive, affect normal/bright, judgement and insight impaired, appearance well groomed and repeats every thing as if to be sure he got it right     Diagnostic Test Results:  Labs reviewed in Epic        Assessment & Plan       ICD-10-CM    1. Traumatic brain injury, without loss of consciousness, subsequent encounter 12 y/o  S06.9X0D    2. Major depression in complete remission (H) F32.5    3. Autism F84.0    4. Overweight (BMI 25.0-29.9) E66.3         BMI:   Estimated body mass index is 28.75 kg/m  as calculated from the following:    Height as of this encounter: 1.905 m (6' 3\").    Weight as of this encounter: 104.3 kg (230 lb).   Weight management plan: Discussed healthy diet and exercise guidelines        Patient " Instructions   1.  Weight Loss Tips  1. Do not eat after 6 hrs before your expected bedtime  2. Have your heaviest meal for breakfast, a slightly lighter meal at lunch and a snack 6 hrs before bed  3. No sugar/calorie drinks except milk ie no fruit juice, pop, alcohol.  4. Drink milk 30min before meals to decrease your hunger. Also it is excellent as part of your last meal of the day snack  5. Drink lots of water  6. Increase fiber in diet: all bran cereal, salads, popcorn etc  7. Have only one small serving of fruit a day about 1/2 cup (as this is high in sugar)  8. EXERCISE is the bottom line. Without it, you will gain weight even on a low calorie diet. Best if done 2-3X a day as can    Being overweight contributes to high blood pressure and high cholesterol, both of which cause heart attacks, strokes and kidney failure, prediabetes and diabetes, arthritis, and liver disease     3. See the Traumatic Brain Injury program at [facility]     4. Ask Dr Buckley for his recommendation for a therapist/ another psychiatrist after he retires           Return in about 2 months (around 12/10/2019).    Ayesha Davis MD  [facility]

## 2019-10-10 NOTE — PATIENT INSTRUCTIONS
1.  Weight Loss Tips  1. Do not eat after 6 hrs before your expected bedtime  2. Have your heaviest meal for breakfast, a slightly lighter meal at lunch and a snack 6 hrs before bed  3. No sugar/calorie drinks except milk ie no fruit juice, pop, alcohol.  4. Drink milk 30min before meals to decrease your hunger. Also it is excellent as part of your last meal of the day snack  5. Drink lots of water  6. Increase fiber in diet: all bran cereal, salads, popcorn etc  7. Have only one small serving of fruit a day about 1/2 cup (as this is high in sugar)  8. EXERCISE is the bottom line. Without it, you will gain weight even on a low calorie diet. Best if done 2-3X a day as can    Being overweight contributes to high blood pressure and high cholesterol, both of which cause heart attacks, strokes and kidney failure, prediabetes and diabetes, arthritis, and liver disease     3. See the Traumatic Brain Injury program at [facility]     4. Ask Dr Buckley for his recommendation for a therapist/ another psychiatrist after he retires

## 2019-11-12 ENCOUNTER — HOSPITAL ENCOUNTER (OUTPATIENT)
Dept: NEUROLOGY | Facility: CLINIC | Age: 24
Setting detail: THERAPIES SERIES
Discharge: STILL ADMITTED OR ADMINISTRATIVE | End: 2019-11-12
Attending: PSYCHIATRY & NEUROLOGY

## 2019-11-12 DIAGNOSIS — F84.0 AUTISM: ICD-10-CM

## 2019-12-12 ENCOUNTER — TELEPHONE (OUTPATIENT)
Dept: FAMILY MEDICINE | Facility: CLINIC | Age: 24
End: 2019-12-12

## 2019-12-12 NOTE — TELEPHONE ENCOUNTER
Our goal is to have forms completed within 72 hours, however some forms may require a visit or additional information.    What clinic location was the form placed at [facility] or [place].?     Who is the form from?   Where did the form come from? Faxed to clinic   The form was placed in the inbox of Ayesha Davis MD      Please fax to [phone]  Phone number: [phone]    Additional comments: [facility] review    Call take on 12/12/2019 at 9:44 AM by Serene Bergman

## 2020-01-09 ENCOUNTER — TRANSFERRED RECORDS (OUTPATIENT)
Dept: HEALTH INFORMATION MANAGEMENT | Facility: CLINIC | Age: 25
End: 2020-01-09

## 2020-01-09 ENCOUNTER — HOSPITAL ENCOUNTER (OUTPATIENT)
Dept: NEUROLOGY | Facility: CLINIC | Age: 25
Setting detail: THERAPIES SERIES
Discharge: STILL ADMITTED OR ADMINISTRATIVE | End: 2020-01-09
Attending: PSYCHOLOGIST

## 2020-01-09 DIAGNOSIS — F84.0 AUTISM: ICD-10-CM

## 2020-02-10 ENCOUNTER — COMMUNICATION - HEALTHEAST (OUTPATIENT)
Dept: NEUROLOGY | Facility: CLINIC | Age: 25
End: 2020-02-10

## 2020-02-10 DIAGNOSIS — F39 EPISODIC MOOD DISORDER (H): ICD-10-CM

## 2020-05-29 ENCOUNTER — COMMUNICATION - HEALTHEAST (OUTPATIENT)
Dept: NEUROLOGY | Facility: CLINIC | Age: 25
End: 2020-05-29

## 2020-05-29 DIAGNOSIS — F39 EPISODIC MOOD DISORDER (H): ICD-10-CM

## 2020-06-26 ENCOUNTER — COMMUNICATION - HEALTHEAST (OUTPATIENT)
Dept: NEUROLOGY | Facility: CLINIC | Age: 25
End: 2020-06-26

## 2020-06-26 DIAGNOSIS — F06.30 MOOD DISORDER IN CONDITIONS CLASSIFIED ELSEWHERE: ICD-10-CM

## 2020-09-24 ENCOUNTER — TRANSFERRED RECORDS (OUTPATIENT)
Dept: HEALTH INFORMATION MANAGEMENT | Facility: CLINIC | Age: 25
End: 2020-09-24

## 2020-09-25 ENCOUNTER — OFFICE VISIT (OUTPATIENT)
Dept: PEDIATRICS | Facility: CLINIC | Age: 25
End: 2020-09-25
Payer: COMMERCIAL

## 2020-09-25 VITALS
BODY MASS INDEX: 29.54 KG/M2 | HEIGHT: 74 IN | OXYGEN SATURATION: 96 % | DIASTOLIC BLOOD PRESSURE: 70 MMHG | HEART RATE: 80 BPM | WEIGHT: 230.2 LBS | TEMPERATURE: 97.6 F | SYSTOLIC BLOOD PRESSURE: 111 MMHG

## 2020-09-25 DIAGNOSIS — Z11.4 ENCOUNTER FOR SCREENING FOR HIV: ICD-10-CM

## 2020-09-25 DIAGNOSIS — Z00.00 ROUTINE GENERAL MEDICAL EXAMINATION AT A HEALTH CARE FACILITY: Primary | ICD-10-CM

## 2020-09-25 DIAGNOSIS — E53.8 VITAMIN B 12 DEFICIENCY: ICD-10-CM

## 2020-09-25 DIAGNOSIS — E55.9 VITAMIN D DEFICIENCY: ICD-10-CM

## 2020-09-25 DIAGNOSIS — Z13.6 CARDIOVASCULAR SCREENING; LDL GOAL LESS THAN 100: ICD-10-CM

## 2020-09-25 PROCEDURE — 99395 PREV VISIT EST AGE 18-39: CPT | Performed by: FAMILY MEDICINE

## 2020-09-25 ASSESSMENT — MIFFLIN-ST. JEOR: SCORE: 2092.93
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
## 2020-10-02 DIAGNOSIS — E55.9 VITAMIN D DEFICIENCY: ICD-10-CM

## 2020-10-02 DIAGNOSIS — Z11.4 ENCOUNTER FOR SCREENING FOR HIV: ICD-10-CM

## 2020-10-02 DIAGNOSIS — Z13.6 CARDIOVASCULAR SCREENING; LDL GOAL LESS THAN 100: ICD-10-CM

## 2020-10-02 DIAGNOSIS — E53.8 VITAMIN B 12 DEFICIENCY: ICD-10-CM

## 2020-10-02 LAB
CHOLEST SERPL-MCNC: 132 MG/DL
DEPRECATED CALCIDIOL+CALCIFEROL SERPL-MC: 45 UG/L (ref 20–75)
GLUCOSE SERPL-MCNC: 83 MG/DL (ref 70–99)
HDLC SERPL-MCNC: 30 MG/DL
HIV 1+2 AB+HIV1 P24 AG SERPL QL IA: NONREACTIVE
LDLC SERPL CALC-MCNC: 65 MG/DL
NONHDLC SERPL-MCNC: 102 MG/DL
TRIGL SERPL-MCNC: 183 MG/DL
VIT B12 SERPL-MCNC: 1029 PG/ML (ref 193–986)

## 2020-10-02 PROCEDURE — 82947 ASSAY GLUCOSE BLOOD QUANT: CPT | Performed by: FAMILY MEDICINE

## 2020-10-02 PROCEDURE — 87389 HIV-1 AG W/HIV-1&-2 AB AG IA: CPT | Performed by: FAMILY MEDICINE

## 2020-10-02 PROCEDURE — 82306 VITAMIN D 25 HYDROXY: CPT | Performed by: FAMILY MEDICINE

## 2020-10-02 PROCEDURE — 82607 VITAMIN B-12: CPT | Performed by: FAMILY MEDICINE

## 2020-10-02 PROCEDURE — 36415 COLL VENOUS BLD VENIPUNCTURE: CPT | Performed by: FAMILY MEDICINE

## 2020-10-02 PROCEDURE — 80061 LIPID PANEL: CPT | Performed by: FAMILY MEDICINE

## 2021-01-08 ENCOUNTER — MEDICAL CORRESPONDENCE (OUTPATIENT)
Dept: HEALTH INFORMATION MANAGEMENT | Facility: CLINIC | Age: 26
End: 2021-01-08

## 2021-02-01 ENCOUNTER — COMMUNICATION - HEALTHEAST (OUTPATIENT)
Dept: NEUROLOGY | Facility: CLINIC | Age: 26
End: 2021-02-01

## 2021-02-01 DIAGNOSIS — F32.A DEPRESSION: ICD-10-CM

## 2021-05-19 ENCOUNTER — COMMUNICATION - HEALTHEAST (OUTPATIENT)
Dept: NEUROLOGY | Facility: CLINIC | Age: 26
End: 2021-05-19

## 2021-05-19 DIAGNOSIS — F39 EPISODIC MOOD DISORDER (H): ICD-10-CM

## 2021-05-28 NOTE — PROGRESS NOTES
Patient's impression of how medication is working? Going well no concerns at this time.     Compliant with Medication? Yes     Side Effects: None    Current Symptoms : No    Pain (0-10) No  Appetite change No  Sleep disturbance No  Change in energy No  Change in interest No  Change in concentration No  Psychosis/Hallucinations No  Negative thoughts No  Mood swings No  Alcohol use No  Drug use No  Anxiety none  Sad/depressed mood none

## 2021-05-28 NOTE — PROGRESS NOTES
"Outpatient Followup Psychiatric Evaluation      Pertinent History: Patient presents today with his mother for the purposes of medication management.  This patient was initially seen by me in December 2014.  He has a history of autism that was diagnosed at age 5.  He's had long-standing cognitive difficulties and behavioral dyscontrol with perseverative thoughts.  At that initial visit we did increase the patient's Zoloft.  I saw the patient for follow-up in January of last year. At that time we added some low dose Abilify.  More recently we increased the patient's Abilify and I did add some when necessary Seroquel.  We changed the patient's Effexor to Prozac last year.  He responded well to this.  We attempted decreases in Abilify and adding some low-dose Wellbutrin in 2017.    When I saw the patient in June 2018 we discontinued the Prozac and started the patient on Celexa which she had had some success with previously.  I also added some as needed Abilify.  I again saw the patient in October 2018.  He was doing better at that time.  We did not make any medication changes.      Current Symptoms:   Patient again was a vague historian but he told me he was doing \"great\".  He denied having any questions or concerns.  He reported he was being treated well.  He offered no specific concerns or complaints.    Most of the history was provided by the staff member present as well as the patient's mother who was present by phone.  They report the patient has been doing quite well.  They believe the recent medication adjustments have been helpful.  He has had no new medical issues and no side effects to the medication.  He sleeping well and eating well.  No change in cognition.  No psychosis and no suicidality.  He continues with some obsessive thoughts and some compulsive behaviors but these are being addressed through behavioral plan changes and the mother would like to continue with the current medications at this time.  We " discussed the possibility of a future increase in Celexa if necessary but at this point they would like to continue with the current treatment plan.    I did review and fill out staff paperwork.    Current Medications: Please see chart. Medications personally reviewed.    Medication Compliance: yes    Side Effects to Medications:  Possible constipation related to the Abilify.      Vitals:  Wt Readings from Last 3 Encounters:   No data found for Wt     Temp Readings from Last 3 Encounters:   No data found for Temp     BP Readings from Last 3 Encounters:   No data found for BP     Pulse Readings from Last 3 Encounters:   No data found for Pulse         Mental Status Exam:   Appearance:  The patient was alert, comfortable and calm. No agitation. Not currently in any pain. No evidence of any shortness of breath.  Behavior:  The patient is calm, cooperative, with no agitation or obvious distress. The patient does participate. No restlessness. No reports of any recent behavioral dyscontrol.  Speech:  Sentence structure is intact.  Somewhat simple and concrete.  Appears baseline.  Able to dialogue. Answers are consistent and somewhat vague.  Not pressured. Not rambling.  Mood/Affect:  Patient appears alert. No obvious depression.  He is baseline socially anxious but reassured verbal and redirectable.  No irritability. No lability.  Thought Content:  No evidence of any psychosis. No reports of any recent psychosis.  Suicidal or Homicidal Thoughts:  None apparent or reported. The patient denies any suicidal or homicidal ideation.   Thought Process/Formulation:  Able to track and follow.  The patient does need prompts and structure.  Somewhat perseverative. No obvious racing thoughts.  Somewhat vague.  Associations:  Fair.  No recent change.  Able to process information.  Fund of Knowledge: Impaired.  No reports of any significant recent change.   Attention/Concentration: Needs prompts to attend. Concentration continues  impaired.  Slow.  Somewhat concrete.  Not disorganized.  Insight:  Grossly unchanged.  Continues impaired  Judgement:  Grossly unchanged.  Continues impaired  Memory:  Grossly fair.  Needing prompts and structure.    Motor Status:  No recent change reported. No current tremor.   Orientation: No reports of any recent change.      Diagnosis managed and treated at today's visit :    Autism by history     Mood disorder, NOS    Plan:  Medication Adjustment:  We will continue with the current treatment plan.    Other:   Patient will return in 6 months for med check.  The patient's mother and the staff member agrees to call or return sooner if questions concerns or problems arise.    Continue with the support of the clinic, reassurance, and redirection. Staff monitoring and ongoing assessments per team plan. Current psychotropic medication appears to represent the minimum effective dosage and appears medically necessary. We will continue to monitor and reassess. This team will utilize appropriate emergency services if necessary. I will make myself available if concerns or problems arise.    Garcia Lock MD

## 2021-06-03 NOTE — PROGRESS NOTES
Outpatient Followup Psychiatric Evaluation      Pertinent History: Patient presents today with his mother for the purposes of medication management.  This patient was initially seen by me in December 2014.  He has a history of autism that was diagnosed at age 5.  He's had long-standing cognitive difficulties and behavioral dyscontrol with perseverative thoughts.  At that initial visit we did increase the patient's Zoloft.  I saw the patient for follow-up in January of last year. At that time we added some low dose Abilify.  More recently we increased the patient's Abilify and I did add some when necessary Seroquel.  We changed the patient's Effexor to Prozac last year.  He responded well to this.  We attempted decreases in Abilify and adding some low-dose Wellbutrin in 2017.    When I saw the patient in June 2018 we discontinued the Prozac and started the patient on Celexa which she had had some success with previously.  I also added some as needed Abilify.  I again saw the patient in October 2018.  He was doing better at that time.  We did not make any medication changes.    I saw the patient in May 2019.  He was doing well at that time and staff agreed.  Mother was also interviewed over the phone and she thought the patient was doing well.  He was tolerating the medication.  We did not make any medication changes.      Current Symptoms:   Patient presents with his mother as well as a staff member.  The patient again is a vague historian and quite suggestible.  Overall the patient is doing better and seems to be able to be redirected at times.  He continues to struggle on community outings or when there are other things that are intrusive or with increased stimulation.  Staff and mother are requesting the possibility of the medication to utilize prior to those activities.    There is been no change in the patient's cognition.  He tends to perseverate at times and he often misinterprets social events.  He is  sleeping well and eating well.  There is been no psychotic symptoms.  No evidence of any suicidality.  Mood is for the most part good but he can get irritable and difficult to redirect.  There is been no new medical diagnoses.  No new allergies.  No side effects to the medication.    I did review and fill out staff paperwork.    Current Medications: Please see chart. Medications personally reviewed.    Medication Compliance: yes    Side Effects to Medications:  Possible constipation related to the Abilify.      Vitals:  Wt Readings from Last 3 Encounters:   No data found for Wt     Temp Readings from Last 3 Encounters:   No data found for Temp     BP Readings from Last 3 Encounters:   No data found for BP     Pulse Readings from Last 3 Encounters:   No data found for Pulse         Mental Status Exam:   Appearance: Patient presents appearing relatively comfortable.  No obvious distress.  Not short of breath.  No obvious pain.  Behavior: Patient maintains good behavioral control.  He is currently not restless or agitated.  Speech: No obvious recent change.  Simple answers.  Often repeats himself or repeats what he just heard.  Fairly concrete.  He continues to need structure and prompts.  Mood/Affect: Not significantly depressed or anxious.  He does become a little anxious when talking about his recent behavioral difficulties.  No lability or agitation.  Thought Content: No reports of any recent psychosis.  No evidence of any psychosis.  Suicidal or Homicidal Thoughts:  None apparent or reported. The patient denies any suicidal or homicidal ideation.   Thought Process/Formulation: Slow.  Somewhat concrete and vague.  He is able to track and follow some conversation.  No racing thoughts.  Associations: No obvious recent change.  Somewhat concrete.  Able to process some simple information.  Fund of Knowledge: No significant recent change.  He continues somewhat impaired.  Attention/Concentration: Slow.  Somewhat  concrete.  Still with impaired concentration.  Insight:  Grossly unchanged.  Continues impaired  Judgement:  Grossly unchanged.  Continues impaired  Memory:  Grossly fair.  Needing prompts and structure.    Motor Status:  No recent change reported. No current tremor.   Orientation: No reports of any recent change.      Diagnosis managed and treated at today's visit :    Autism by history     Mood disorder, NOS    Plan:  Medication Adjustment:  I have added some low-dose as needed Abilify to use prior to community events.    Other:   I have ordered an individual therapist with consideration for a behavioral analyst.  We will have the patient return to this clinic in 4-6 months for further assessment and treatment.  They agree to call sooner with any questions, concerns or problems.    Continue with the support of the clinic, reassurance, and redirection. Staff monitoring and ongoing assessments per team plan. Current psychotropic medication appears to represent the minimum effective dosage and appears medically necessary. We will continue to monitor and reassess. This team will utilize appropriate emergency services if necessary. I will make myself available if concerns or problems arise.    Garcia Lock MD

## 2021-06-03 NOTE — PROGRESS NOTES
Patient's impression of how medication is working? Pt states he has been in a good mood most of the time since last appt.     Compliant with Medication? Yes     Side Effects: None    Pain (0-10) Yes, head pain from cheering on team   Appetite change No  Sleep disturbance No  Change in energy No  Change in interest No  Change in concentration No  Psychosis/Hallucinations No  Negative thoughts No  Mood swings Yes  Alcohol use No  Drug use No  Anxiety moderate  Sad/depressed mood none

## 2021-06-05 NOTE — PROGRESS NOTES
Psychology Progress Note    Date: January 09, 2020    Time length and type of treatment: 28 minutes (10:00 AM to 10:28 AM), individual therapy    Necessity: This session is necessary to clarify reason for referral and address the patient's anxiety.      Intervention: This writer utilized psychoeducation and cognitive behavioral therapy to address the above.      Mental Status:   Grooming: Within normal limits  Attire: Appropriate  Age: Appears Stated  Behavior Towards Examiner: Cooperative  Motor Activity: Patient ambulates with a rigid gait  Eye Contact: Avoidant  Mood: Anxious  Affect: Flat  Speech/Language: Monotone  Attention: Distractible  Concentration: Brief  Thought Process: [facility]  Thought Content: No evidence of delusions or hallucinations  Orientation: Not established  Memory: Impairment  Judgement: Impairment  Estimated Intelligence: Below Average  Demonstrated Insight: Diminished  Fund of Knowledge: inadequate      Progress:  The patient, a staff person from [facility] where the patient resides were present, and the patient's mom (Soraida Hastings) who is the patient's guardian was present by phone. She gave verbal approval for Mr. Morgan to be present and provided with all information. The patient's mom explained that the patient has autism, a TBI, can be aggressive, and struggles with anxiety.  She explained she is interested in talk therapy for her son, and has heard EMDR is effective.  I explained that I do not have a core competency in working with autism patients and do not believe that I would be the best psychologist to work with the patient.  It was noted that other centers and psychologists have expertise in working with autistic patients and would be much better suited to work with the patient.  After a brief discussion, I agreed to use today's time to teach the patient a very simple, behavioral strategy to help with anxiety, and Mr. Morgan would follow up with  [facility] for ongoing therapy.  The patient has already been practicing walking away when he is anxious, angry, or upset, and this was reinforced as an important first step.  Patient was taught to next turn up his hands and expose them as if he were waiting for someone to hand him something, then to place one hand on his heart and to focus on deepening his breath so that he can feel his chest expand and contract.  Once he has calmed sufficiently, he was directed to sit down and give himself further time and space to collect himself.  The patient was able to practice these steps, and Mr. Morgan wrote the steps down so that staff at [facility] will be able to prompt the patient in the moment.  The patient, his mom, and Mr. Morgan expressed appreciation for this strategy and comfort with the referral to a provider with expertise in autism.  They plan to follow up with [facility].      Plan: It was recommended that the family pursue therapy with a psychologist who has expertise in working with autism and after a brief discussion they expressed an intention to follow up with [facility].  No further follow up is planned.    Diagnosis:  Autism Spectrum Disorder, by history

## 2021-06-09 NOTE — PROGRESS NOTES
Outpatient Followup Psychiatric Evaluation      Pertinent History: Patient presents today with his mother for the purposes of medication management.  This patient was initially seen by me in December 2014.  He has a history of autism that was diagnosed at age 5.  He's had long-standing cognitive difficulties and behavioral dyscontrol with perseverative thoughts.  At that initial visit we did increase the patient's Zoloft.  I saw the patient for follow-up in January of last year. At that time we added some low dose Abilify.  More recently we increased the patient's Abilify and I did add some when necessary Seroquel.  We changed the patient's Effexor to Prozac 3 visits ago and then eventually increased the Prozac further.  He responded well to this.  2 visits ago we did decrease the Abilify.  At the last visit I added some low dose Wellbutrin.    Current Symptoms:   The patient again is a poor historian but states things are going well.  His mother who was present reports the patient is doing much better with better attention and focus.  Less anxiety.  He is following conversations better.  He's been tolerating the Wellbutrin.  They've ended up splitting it and her giving it twice a day.  I have changed the order to reflect that and allow the SR form not to be with.  They are in agreement with that plan.    The patient has not been depressed.  He's been less anxious.  He's been more active and is lost 8 pounds he's been working out more.  He sleeping well.  No hallucinations or delusions.  No significant change in cognition but as stated above he is tracking and following better as his anxiety has improved.  No side effects to the medication.    Current Medications: Please see chart. Medications personally reviewed.    Medication Compliance: yes    Side Effects to Medications:  Possible constipation related to the Abilify.      Vitals:  Wt Readings from Last 3 Encounters:   No data found for Wt     Temp Readings from  Last 3 Encounters:   No data found for Temp     BP Readings from Last 3 Encounters:   No data found for BP     Pulse Readings from Last 3 Encounters:   No data found for Pulse         Mental Status Exam:   Patient was pleasant appreciative and polite.  He again was a bit perseverative with his speech but he was able to answer some simple questions.  Not rambling.  Slightly pressured at times.  His mood appeared to be slightly anxious but not depressed.  No lability.  Attention and concentration were perhaps a bit better but still impaired.  Thought content does not show psychosis.  No suicidal or homicidal ideation.  Thought formation does not show the patient to be loose.  Insight, judgment memory are all baseline impaired and unchanged.  Fund of knowledge is baseline impaired.    Diagnosis managed and treated at today's visit :    Axis I: Autism by history   Mood disorder, NOS    Axis II: Deferred    Axis III: Please see initial psychiatric consultation note      Plan:  Medication Adjustment:  I am going to change the patient's Wellbutrin to 100 mg of the SR form twice a day.    Other:   Patient will return in 3 months for med check.  The patient's mother agrees to call or return sooner if questions concerns or problems arise.    Continue with the support of the clinic, reassurance, and redirection. Staff monitoring and ongoing assessments per team plan. Current psychotropic medication appears to represent the minimum effective dosage and appears medically necessary. We will continue to monitor and reassess. This team will utilize appropriate emergency services if necessary. I will make myself available if concerns or problems arise.    Garcia Lock

## 2021-06-09 NOTE — PROGRESS NOTES
Patient's impression of how medication is working? Pt said med are good.    Compliant with Medication? yes    Side Effects: None    Current Symptoms : No    Pain (0-10) No  Appetite change No  Negative thoughts No  Alcohol use No  Drug use No  Mood swings No  Sleep disturbance No  Change in interest Yes and better enegry  Change in energy No  Change in concentration No  Psychosis/Hallucinations No  Anxiety minimal  Sad/depressed mood none

## 2021-06-11 NOTE — PROGRESS NOTES
Outpatient Followup Psychiatric Evaluation      Pertinent History: Patient presents today with his mother for the purposes of medication management.  This patient was initially seen by me in December 2014.  He has a history of autism that was diagnosed at age 5.  He's had long-standing cognitive difficulties and behavioral dyscontrol with perseverative thoughts.  At that initial visit we did increase the patient's Zoloft.  I saw the patient for follow-up in January of last year. At that time we added some low dose Abilify.  More recently we increased the patient's Abilify and I did add some when necessary Seroquel.  We changed the patient's Effexor to Prozac 3 visits ago and then eventually increased the Prozac further.  He responded well to this.  3 visits ago we did decrease the Abilify.  2 visits ago I added some low-dose Wellbutrin and at the last visit I increased that medication.  The patient's mother did call in May and talk to the nurse practitioner about dietary supplementation.      Current Symptoms:   Patient presents with the group home staff member as well as with his mother.  All report the patient is doing extremely well.  His moods been good he is been less anxious.  He is been directable and engaged in a lot of activities.  He sleeping well eating more healthfully and has been losing weight.  They are all quite pleased with this.  There is been no psychosis.  The patient does have times where he talks about being a super hero and needing to save people but this is easily redirectable and it does not appear to be anxiety provoking.  He has had a tremor which appears to be mild and intentional and noted when he is doing activities such as putting on his belt or on his iPad.  It is not bothersome to the patient and he has not commented on it.  We did discuss the possible causes of this including the possibility that medications may be causing it.  Because the patient is doing so well however they would  not change medication at this time and asked that we continue with the current treatment plan.  There is no evidence of any tardive dyskinesia on exam.  The patient has been otherwise healthy.  No other questions or concerns.  All are quite pleased.    Current Medications: Please see chart. Medications personally reviewed.    Medication Compliance: yes    Side Effects to Medications:  Possible constipation related to the Abilify.      Vitals:  Wt Readings from Last 3 Encounters:   No data found for Wt     Temp Readings from Last 3 Encounters:   No data found for Temp     BP Readings from Last 3 Encounters:   No data found for BP     Pulse Readings from Last 3 Encounters:   No data found for Pulse         Mental Status Exam:   The patient avoided eye contact but appeared comfortable and calm and seemed to enjoy the interaction.  No pain or shortness of breath.  He was attending and participating but did not need prompts and had limited initiation.  Answers were consistent and fairly concrete.  At times he was a bit perseverative.  Mood was not depressed or anxious.  Affect was a bit blunted.  Thought content does not show any hallucinations or delusions.  No suicidal or homicidal ideation.  Thought formation does not show the patient to be loose.  Insight, judgment and memory are all grossly at baseline and unchanged.  Fund of knowledge is impaired and at baseline.     Diagnosis managed and treated at today's visit :    Axis I: Autism by history   Mood disorder, NOS    Axis II: Deferred    Axis III: Please see initial psychiatric consultation note      Plan:  Medication Adjustment:  We will continue with the current medication regime.    Other:   Patient will return in 3-4 months for med check.  The patient's mother agrees to call or return sooner if questions concerns or problems arise.    Continue with the support of the clinic, reassurance, and redirection. Staff monitoring and ongoing assessments per team plan.  Current psychotropic medication appears to represent the minimum effective dosage and appears medically necessary. We will continue to monitor and reassess. This team will utilize appropriate emergency services if necessary. I will make myself available if concerns or problems arise.    Garcia Lock

## 2021-06-11 NOTE — PROGRESS NOTES
Patient's impression of how medication is working? Pt said med help in relax. And he loss some malcolm.    Compliant with Medication? yes    Side Effects: None    Current Symptoms : No    Pain (0-10) No  Appetite change No  Negative thoughts No  Alcohol use No  Drug use No  Mood swings No  Sleep disturbance No  Change in interest No  Change in energy No  Change in concentration No  Psychosis/Hallucinations No  Anxiety mild  Sad/depressed mood moderate

## 2021-06-13 NOTE — PROGRESS NOTES
Patient's impression of how medication is working? Good. Mom would like to talk about upping the fluoxetine    Compliant with Medication? Yes    Side Effects: None    Current Symptoms : Yes    Pain (0-10) No  Appetite change No  Negative thoughts No  Alcohol use No  Drug use No  Mood swings No  Sleep disturbance No  Change in interest No  Change in energy No  Change in concentration No  Psychosis/Hallucinations No  Anxiety moderate  Sad/depressed mood mild

## 2021-06-13 NOTE — PROGRESS NOTES
Outpatient Followup Psychiatric Evaluation      Pertinent History: Patient presents today with his mother for the purposes of medication management.  This patient was initially seen by me in December 2014.  He has a history of autism that was diagnosed at age 5.  He's had long-standing cognitive difficulties and behavioral dyscontrol with perseverative thoughts.  At that initial visit we did increase the patient's Zoloft.  I saw the patient for follow-up in January of last year. At that time we added some low dose Abilify.  More recently we increased the patient's Abilify and I did add some when necessary Seroquel.  We changed the patient's Effexor to Prozac last year.  He responded well to this.  4 visits ago we did decrease the Abilify.  3 visits ago I added some low-dose Wellbutrin and 2 visits ago I increased that medication.  The patient's mother did call in May and talk to the nurse practitioner about dietary supplementation.  We did not make any medication changes at the last visit.      Current Symptoms:   Patient presents with his mother as well as with the staff member.  All report the patient is doing fairly well.  The patient again is somewhat perseverative throughout the interview and the mother reports this continues.  Slightly better with the Prozac and she is wondering about a further increase.  He is tolerating the medication.  The patient denies depression.  He denies feeling hopeless or helpless or worthless.  No desire to be dead or thoughts of suicide.  No psychosis.  No change in cognition.  He continues to sleep well and sleeps about 11 hours at night.  He has good energy during the day.  He is involved in a lot of different activities and currently is attending a structured work program and that is going well except there is another client there that the patient does perseverate on who apparently bullies him.  We did spend quite a bit of time talking about that and some avoidant strategies.   And we discussed distraction as well.  We did discuss the current medications.  No side effects.  As stated above the mother would like an increase in the Prozac and we will see how that goes.    Current Medications: Please see chart. Medications personally reviewed.    Medication Compliance: yes    Side Effects to Medications:  Possible constipation related to the Abilify.      Vitals:  Wt Readings from Last 3 Encounters:   No data found for Wt     Temp Readings from Last 3 Encounters:   No data found for Temp     BP Readings from Last 3 Encounters:   No data found for BP     Pulse Readings from Last 3 Encounters:   No data found for Pulse         Mental Status Exam:   Patient was alert.  He sought occasional reassurance.  He again was somewhat perseverative and wanted to know specific timeframes and dates.  He was very attentive to details.  Speech was somewhat rapid and abrupt.  This was baseline for him.  Answers were consistent.  Mood was not obviously depressed or anxious.  Attention and concentration appeared adequate.  Thought content does not show any hallucinations or delusions.  No suicidal or homicidal ideation.  Thought formation does not show the patient to be loose.  Insight, judgment and memory are all grossly at baseline with no recent change.  Fund of knowledge is baseline.    Diagnosis managed and treated at today's visit :    Autism by history     Mood disorder, NOS    Plan:  Medication Adjustment:  I am going to increase Prozac to 60 mg a day.    Other:   Patient will return in 3-4 months for med check.  The patient's mother agrees to call or return sooner if questions concerns or problems arise.    Continue with the support of the clinic, reassurance, and redirection. Staff monitoring and ongoing assessments per team plan. Current psychotropic medication appears to represent the minimum effective dosage and appears medically necessary. We will continue to monitor and reassess. This team will  utilize appropriate emergency services if necessary. I will make myself available if concerns or problems arise.    Garcia Lock MD

## 2021-06-14 ENCOUNTER — COMMUNICATION - HEALTHEAST (OUTPATIENT)
Dept: BEHAVIORAL HEALTH | Facility: CLINIC | Age: 26
End: 2021-06-14

## 2021-06-17 NOTE — PROGRESS NOTES
Outpatient Followup Psychiatric Evaluation      Pertinent History: Patient presents today with his mother for the purposes of medication management.  This patient was initially seen by me in December 2014.  He has a history of autism that was diagnosed at age 5.  He's had long-standing cognitive difficulties and behavioral dyscontrol with perseverative thoughts.  At that initial visit we did increase the patient's Zoloft.  I saw the patient for follow-up in January of last year. At that time we added some low dose Abilify.  More recently we increased the patient's Abilify and I did add some when necessary Seroquel.  We changed the patient's Effexor to Prozac last year.  He responded well to this.  We attempted decreases in Abilify and adding some low-dose Wellbutrin last year.  At the last visit we did increase his Prozac.      Current Symptoms:   Patient again is a vague historian and a bit perseverative and anxious.  His mother provides most the history and reports that with the increase in Prozac he seemed to be a bit more anxious a bit more confrontational and perseverative on such things as being a super hero.  She would like to decrease in that medication we did discuss the risks and benefits.  The patient is sleeping fairly well.  He has had an increase in his appetite.  There has been some thinking about him being a super hero.  That perhaps is a bit more intense.  He however is able to maintain a good social calendar he is getting out of the house and seems to enjoy things that are being planned for him and he does participate.  He has been redirectable.  No significant concerns about potential for aggression.  No other obvious side effects to the medication.    I did review and fill out [employer] paperwork.    Current Medications: Please see chart. Medications personally reviewed.    Medication Compliance: yes    Side Effects to Medications:  Possible constipation related to the Abilify.      Vitals:  Wt  Readings from Last 3 Encounters:   No data found for Wt     Temp Readings from Last 3 Encounters:   No data found for Temp     BP Readings from Last 3 Encounters:   No data found for BP     Pulse Readings from Last 3 Encounters:   No data found for Pulse         Mental Status Exam:   Appearance: Patient was a bit anxious but was pleasant and polite and directable.  Behavior: Rocking a bit back and forth but overall directable.  Not threatening.  Behavioral issues as described above at the group home.  Speech: Simple perseverative and unchanged.  Mood/Affect: Slightly anxious.  Possibly a bit depressed.  Not agitated.  Thought Content: No current psychosis but continues to have periodic thoughts of being a super hero  Suicidal or Homicidal Thoughts: None reported or observed  Thought Process/Formulation: A bit perseverative but redirectable  Associations: Baseline impaired  Fund of Knowledge: Impaired  Attention/Concentration: Distractible with impaired concentration and perseveration.  No obvious recent change.  Insight: Impaired but unchanged  Judgement: Impaired  Memory: Impaired  Motor Status: No tremor  Orientation: Baseline.  Please see therapy notes.      Diagnosis managed and treated at today's visit :    Autism by history     Mood disorder, NOS    Plan:  Medication Adjustment:  I am going to decrease Prozac back to 10 mg twice a day.  Risks and benefits were discussed.  The patient was on Prozac 20 mg twice a day.  Our records were not accurate.    Other:   Patient will return in 3 months for med check.  The patient's mother agrees to call or return sooner if questions concerns or problems arise.    Continue with the support of the clinic, reassurance, and redirection. Staff monitoring and ongoing assessments per team plan. Current psychotropic medication appears to represent the minimum effective dosage and appears medically necessary. We will continue to monitor and reassess. This team will utilize  appropriate emergency services if necessary. I will make myself available if concerns or problems arise.    Garcia Lock MD

## 2021-06-18 NOTE — PROGRESS NOTES
Patient's impression of how medication is working? Okay    Compliant with Medication? Yes    Side Effects: None    Current Symptoms : Yes      Pain (0-10) No  Appetite change No  Sleep disturbance No  Change in energy No  Change in interest No  Change in concentration No  Psychosis/Hallucinations No  Negative thoughts Yes  Mood swings Yes  Alcohol use No  Drug use No  Anxiety minimal  Sad/depressed mood minimal

## 2021-06-18 NOTE — PROGRESS NOTES
Outpatient Followup Psychiatric Evaluation      Pertinent History: Patient presents today with his mother for the purposes of medication management.  This patient was initially seen by me in December 2014.  He has a history of autism that was diagnosed at age 5.  He's had long-standing cognitive difficulties and behavioral dyscontrol with perseverative thoughts.  At that initial visit we did increase the patient's Zoloft.  I saw the patient for follow-up in January of last year. At that time we added some low dose Abilify.  More recently we increased the patient's Abilify and I did add some when necessary Seroquel.  We changed the patient's Effexor to Prozac last year.  He responded well to this.  We attempted decreases in Abilify and adding some low-dose Wellbutrin last year.  There has been some confusion about the patient's Prozac dosage.  The plan was to increase the medication which we have done recently.      Current Symptoms:   Was unable to provide much history.  He was somewhat anxious and sought reassurance from his mother.  He again was somewhat perseverative and repetitive in his speech.  The staff present and the mother reports that with the reasoning change in Prozac there is been some increase behaviors.  He has had some impulse control issues.  Apparently 3 outbursts in the last week or 2.  He has been on higher doses of Prozac in the past but they report he becomes more perseverative with that.  We discussed a variety of possible treatment options and we did elect to engage in the changes as described below.  No change in cognition.  No change in sleep or appetite.  No psychosis.  The patient's behaviors are  Perseverative with impulse control issues particularly around his 4 roommates.  That is been quite problematic for the home.  They are imploring some behavior modification techniques with the staff and we will see how that goes.  There is been no new medical issues.  No obvious side effects to  the medications.  No suicidality.    I did review and fill out staff paperwork.    Current Medications: Please see chart. Medications personally reviewed.    Medication Compliance: yes    Side Effects to Medications:  Possible constipation related to the Abilify.      Vitals:  Wt Readings from Last 3 Encounters:   No data found for Wt     Temp Readings from Last 3 Encounters:   No data found for Temp     BP Readings from Last 3 Encounters:   No data found for BP     Pulse Readings from Last 3 Encounters:   No data found for Pulse         Mental Status Exam:   Appearance:  The patient was slow.  He was a bit distractible.  He did become somewhat anxious.  Limited eye contact.  Behavior: He was disorganized at times.  Perseverative.  Sought reassurance from his mom frequently.  Somewhat anxious.  Limited participation or interest.  Not currently restless or agitated.  He has had some recent behavioral issues as described above.  Speech: Soft-spoken, rapid and repetitive.  Extremely vague.  Mood/Affect: Awake, slow and flat.  Appears depressed and anxious..  Not currently labile.  No current agitation.  Thought Content:  No evidence of any psychosis. No reports of any recent psychosis.  Suicidal or Homicidal Thoughts: None reported.  None apparent.  The patient has had no suicidal behavior.  Thought Process/Formulation: Distractible and disorganized.  The patient does need prompts and structure.  [place].  Vague.  No obvious racing thoughts.  Associations: Continues impaired.  No recent change.  Able to process some information.  Fund of Knowledge: Continues impaired by report and on exam.  Please see the therapy notes.  Attention/Concentration: Limited participation and interest.  Needing prompts.  Concentration appears impaired.  Slow.  Somewhat concrete.  Not disorganized.  Insight:  Grossly impaired.  No obvious recent change.  Please see therapy notes  Judgement:  Grossly impaired.  No obvious recent  change  Memory:  Grossly impaired.  No obvious recent change.  Needing prompts and structure.  Please see therapy notes.  Motor Status:  No recent change reported. No current tremor.  Orientation: Continues to be impaired by report.  Please see the therapy notes.      Diagnosis managed and treated at today's visit :    Autism by history     Mood disorder, NOS    Plan:  Medication Adjustment:  Since there is been no improvement with Prozac and he is failed at higher doses I have discontinued that medication.  It will self taper.  The patient has been on Celexa with good results in the past and the mother would like to try that again.  I placed him on 20 mg of Celexa a day.  She was quite concerned that that would not be enough so I told them if he is tolerating the medication we could increase that to 40 mg a day in a week.  I have also added Abilify 5 mg a day as a as needed.  Risks and benefits were discussed.    Other:   Patient will return in 2-3 months for med check.  The patient's mother agrees to call or return sooner if questions concerns or problems arise.    Continue with the support of the clinic, reassurance, and redirection. Staff monitoring and ongoing assessments per team plan. Current psychotropic medication appears to represent the minimum effective dosage and appears medically necessary. We will continue to monitor and reassess. This team will utilize appropriate emergency services if necessary. I will make myself available if concerns or problems arise.    Garcia Lock MD

## 2021-06-21 NOTE — PROGRESS NOTES
Outpatient Followup Psychiatric Evaluation      Pertinent History: Patient presents today with his mother for the purposes of medication management.  This patient was initially seen by me in December 2014.  He has a history of autism that was diagnosed at age 5.  He's had long-standing cognitive difficulties and behavioral dyscontrol with perseverative thoughts.  At that initial visit we did increase the patient's Zoloft.  I saw the patient for follow-up in January of last year. At that time we added some low dose Abilify.  More recently we increased the patient's Abilify and I did add some when necessary Seroquel.  We changed the patient's Effexor to Prozac last year.  He responded well to this.  We attempted decreases in Abilify and adding some low-dose Wellbutrin in 2017.    When I saw the patient in June 2018 we discontinued the Prozac and started the patient on Celexa which she had had some success with previously.  I also added some as needed Abilify.      Current Symptoms:   Patient again was a vague historian but he admitted he was doing better.  The patient presented with the staff member and we did call his mother and had her there by phone.  All report the patient has improved significantly with the recent medication changes.  They have not required any as needed medication.  There is been no change in cognition but behaviorally he seems much better he is more redirectable.  He seems happier according to the mother.  She does report he still craves carbohydrates and sugar but he is more redirectable with that.  There is been no new medical issues and no side effects to the medication.  Patient is sleeping well.  All present want to continue with the current treatment plan.    I did review and fill out staff paperwork.    Current Medications: Please see chart. Medications personally reviewed.    Medication Compliance: yes    Side Effects to Medications:  Possible constipation related to the  Abilify.      Vitals:  Wt Readings from Last 3 Encounters:   No data found for Wt     Temp Readings from Last 3 Encounters:   No data found for Temp     BP Readings from Last 3 Encounters:   No data found for BP     Pulse Readings from Last 3 Encounters:   No data found for Pulse         Mental Status Exam:   Appearance: Patient presents appearing relatively comfortable.  Sitting up.  Well-groomed.  Directable.  No obvious distress.  Not short of breath.  No obvious pain.  Behavior: Patient maintains good behavioral control.  He is currently not restless or agitated.  He does not initiate much and spends a lot of the time looking down at a video game.  He is not in any significant distress however.  Speech: No obvious recent change.  Simple answers.  Fairly concrete.  He continues to need structure and prompts.  Mood/Affect: Not significantly depressed or anxious.  No lability or agitation.  Thought Content: No reports of any recent psychosis.  No evidence of any psychosis.  Suicidal or Homicidal Thoughts:  None apparent or reported. The patient denies any suicidal or homicidal ideation.   Thought Process/Formulation: Slow.  Somewhat concrete and vague.  He is able to track and follow some conversation.  No racing thoughts.  Associations: No obvious recent change.  Somewhat concrete.  Able to process some simple information.  Fund of Knowledge: No significant recent change.  He continues somewhat impaired.  Attention/Concentration: Slow.  Somewhat concrete.  Still with impaired concentration.  Insight:  Grossly unchanged.  Continues impaired  Judgement:  Grossly unchanged.  Continues impaired  Memory:  Grossly fair.  Needing prompts and structure.    Motor Status:  No recent change reported. No current tremor.   Orientation: No reports of any recent change.      Diagnosis managed and treated at today's visit :    Autism by history     Mood disorder, NOS    Plan:  Medication Adjustment:  I will make no psychotropic  medication changes at this time.    Other:   Patient will return in 4-6 months for med check.  The patient's mother agrees to call or return sooner if questions concerns or problems arise.    Continue with the support of the clinic, reassurance, and redirection. Staff monitoring and ongoing assessments per team plan. Current psychotropic medication appears to represent the minimum effective dosage and appears medically necessary. We will continue to monitor and reassess. This team will utilize appropriate emergency services if necessary. I will make myself available if concerns or problems arise.    Garcia Lock MD

## 2021-06-21 NOTE — PROGRESS NOTES
Patient's impression of how medication is working? Pt states his medications are working fine.     Compliant with Medication? None    Side Effects: None    Current Symptoms : No    Pain (0-10) No  Appetite change Yes  Sleep disturbance No  Change in energy No  Change in interest No  Change in concentration Yes  Psychosis/Hallucinations No  Negative thoughts No  Mood swings No  Alcohol use No  Drug use No  Anxiety none  Sad/depressed mood none

## 2021-06-25 NOTE — TELEPHONE ENCOUNTER
Patient mom called to request to speak to an RN about supplements and a possible medication change.  Patient has historically been seen out of [facility].  Patient mom said when she called [facility] they gave her our number and said we will be able to help her.  Writer consulted with supervisor and patient will need to be assisted out of [facility].

## 2021-07-03 NOTE — ADDENDUM NOTE
Addendum Note by Tayler Doty CMA at 10/30/2018 10:01 AM     Author: Tayler Doty CMA Service: -- Author Type: Certified Medical Assistant    Filed: 10/30/2018 10:01 AM Date of Service: 10/30/2018 10:01 AM Status: Signed    : Tayler Doty CMA (Certified Medical Assistant)    Encounter addended by: Tayler Doty CMA on: 10/30/2018 10:01 AM<BR>     Actions taken: Charge Capture section accepted

## 2021-07-03 NOTE — ADDENDUM NOTE
Addendum Note by Connie Santos MA at 6/13/2017 12:43 PM     Author: Connie Santos MA Service: -- Author Type: Medical Assistant    Filed: 6/13/2017 12:43 PM Date of Service: 6/13/2017 12:43 PM Status: Signed    : Connie Santos MA (Medical Assistant)    Encounter addended by: Connie Santos MA on: 6/13/2017 12:43 PM<BR>     Actions taken: Charge Capture section accepted

## 2021-07-03 NOTE — ADDENDUM NOTE
Addendum Note by Tayler Doty CMA at 10/10/2017 10:37 AM     Author: Tayler Doty CMA Service: -- Author Type: Certified Medical Assistant    Filed: 10/10/2017 10:37 AM Date of Service: 10/10/2017 10:37 AM Status: Signed    : Tayler Doty CMA (Certified Medical Assistant)    Encounter addended by: Tayler Doty CMA on: 10/10/2017 10:37 AM<BR>     Actions taken: Charge Capture section accepted

## 2021-07-03 NOTE — ADDENDUM NOTE
Addendum Note by Cheryl Melvin RN at 4/10/2018 11:59 PM     Author: Cheryl Melvin RN Service: Neurology Author Type: Registered Nurse    Filed: 4/11/2018  7:50 AM Date of Service: 4/10/2018 11:59 PM Status: Signed    : Cheryl Melvin RN (Registered Nurse)    Encounter addended by: Cheryl Melvin RN on: 4/11/2018  7:50 AM<BR>     Actions taken: Order Reconciliation Section accessed, Pharmacy for encounter modified

## 2021-10-19 PROBLEM — F32.9 MAJOR DEPRESSION: Status: ACTIVE | Noted: 2018-03-16

## 2021-12-08 ENCOUNTER — OFFICE VISIT (OUTPATIENT)
Dept: INTERNAL MEDICINE | Facility: CLINIC | Age: 26
End: 2021-12-08
Payer: COMMERCIAL

## 2021-12-08 VITALS
HEART RATE: 76 BPM | DIASTOLIC BLOOD PRESSURE: 65 MMHG | HEIGHT: 74 IN | TEMPERATURE: 97.9 F | BODY MASS INDEX: 29.39 KG/M2 | RESPIRATION RATE: 18 BRPM | OXYGEN SATURATION: 96 % | WEIGHT: 229 LBS | SYSTOLIC BLOOD PRESSURE: 103 MMHG

## 2021-12-08 DIAGNOSIS — Z00.00 ENCOUNTER FOR ROUTINE ADULT HEALTH EXAMINATION WITHOUT ABNORMAL FINDINGS: Primary | ICD-10-CM

## 2021-12-08 DIAGNOSIS — F84.0 AUTISM: ICD-10-CM

## 2021-12-08 DIAGNOSIS — F33.9 EPISODE OF RECURRENT MAJOR DEPRESSIVE DISORDER, UNSPECIFIED DEPRESSION EPISODE SEVERITY (H): ICD-10-CM

## 2021-12-08 DIAGNOSIS — Z13.6 CARDIOVASCULAR SCREENING; LDL GOAL LESS THAN 130: ICD-10-CM

## 2021-12-08 DIAGNOSIS — F39 EPISODIC MOOD DISORDER (H): ICD-10-CM

## 2021-12-08 DIAGNOSIS — Z79.899 HIGH RISK MEDICATION USE: ICD-10-CM

## 2021-12-08 LAB
ALBUMIN SERPL-MCNC: 4.1 G/DL (ref 3.4–5)
ALP SERPL-CCNC: 92 U/L (ref 40–150)
ALT SERPL W P-5'-P-CCNC: 53 U/L (ref 0–70)
ANION GAP SERPL CALCULATED.3IONS-SCNC: 4 MMOL/L (ref 3–14)
AST SERPL W P-5'-P-CCNC: 24 U/L (ref 0–45)
BILIRUB SERPL-MCNC: 1 MG/DL (ref 0.2–1.3)
BUN SERPL-MCNC: 14 MG/DL (ref 7–30)
CALCIUM SERPL-MCNC: 9.2 MG/DL (ref 8.5–10.1)
CHLORIDE BLD-SCNC: 106 MMOL/L (ref 94–109)
CO2 SERPL-SCNC: 29 MMOL/L (ref 20–32)
CREAT SERPL-MCNC: 0.92 MG/DL (ref 0.66–1.25)
ERYTHROCYTE [DISTWIDTH] IN BLOOD BY AUTOMATED COUNT: 12.7 % (ref 10–15)
GFR SERPL CREATININE-BSD FRML MDRD: >90 ML/MIN/1.73M2
GLUCOSE BLD-MCNC: 87 MG/DL (ref 70–99)
HCT VFR BLD AUTO: 49.2 % (ref 40–53)
HGB BLD-MCNC: 16.7 G/DL (ref 13.3–17.7)
MCH RBC QN AUTO: 31.1 PG (ref 26.5–33)
MCHC RBC AUTO-ENTMCNC: 33.9 G/DL (ref 31.5–36.5)
MCV RBC AUTO: 92 FL (ref 78–100)
PLATELET # BLD AUTO: 233 10E3/UL (ref 150–450)
POTASSIUM BLD-SCNC: 4.4 MMOL/L (ref 3.4–5.3)
PROT SERPL-MCNC: 7.9 G/DL (ref 6.8–8.8)
RBC # BLD AUTO: 5.37 10E6/UL (ref 4.4–5.9)
SODIUM SERPL-SCNC: 139 MMOL/L (ref 133–144)
TSH SERPL DL<=0.005 MIU/L-ACNC: 0.52 MU/L (ref 0.4–4)
WBC # BLD AUTO: 6.3 10E3/UL (ref 4–11)

## 2021-12-08 PROCEDURE — 36415 COLL VENOUS BLD VENIPUNCTURE: CPT | Performed by: INTERNAL MEDICINE

## 2021-12-08 PROCEDURE — 99395 PREV VISIT EST AGE 18-39: CPT | Performed by: INTERNAL MEDICINE

## 2021-12-08 PROCEDURE — 85027 COMPLETE CBC AUTOMATED: CPT | Performed by: INTERNAL MEDICINE

## 2021-12-08 PROCEDURE — 84443 ASSAY THYROID STIM HORMONE: CPT | Performed by: INTERNAL MEDICINE

## 2021-12-08 PROCEDURE — 80061 LIPID PANEL: CPT | Performed by: INTERNAL MEDICINE

## 2021-12-08 PROCEDURE — 80053 COMPREHEN METABOLIC PANEL: CPT | Performed by: INTERNAL MEDICINE

## 2021-12-08 ASSESSMENT — MIFFLIN-ST. JEOR: SCORE: 2082.46

## 2021-12-08 NOTE — PROGRESS NOTES
SUBJECTIVE:   CC: Hamilton Hastings is an 26 year old male who presents for preventative health visit.     Here with his current group home staff.    I last saw the patient January 2016, he has seen numerous other providers since that time.  I reviewed the records in the chart since the last visit with myself      Patient has been advised of split billing requirements and indicates understanding: Yes  Healthy Habits:    Getting at least 3 servings of Calcium per day:  Yes    Bi-annual eye exam:  Yes    Dental care twice a year:  Yes    Sleep apnea or symptoms of sleep apnea:  None    Diet:  Regular (no restrictions)    Frequency of exercise:  4-5 days/week    Duration of exercise:  15-30 minutes    Taking medications regularly:  Yes    Barriers to taking medications:  None    Medication side effects:  Not applicable    PHQ-2 Total Score:    Additional concerns today:  Yes (form completion)      1.  History of autism with episodic mood disorder.  Taking numerous psychiatric medicines through his psychiatrist.        Today's PHQ-2 Score:   PHQ-2 ( 1999 [employer]) 9/25/2020   Q1: Little interest or pleasure in doing things 0   Q2: Feeling down, depressed or hopeless 0   PHQ-2 Score 0   PHQ-2 Total Score (12-17 Years)- Positive if 3 or more points; Administer PHQ-A if positive 0   Q1: Little interest or pleasure in doing things -   Q2: Feeling down, depressed or hopeless -   PHQ-2 Score -        Abuse: Current or Past(Physical, Sexual or Emotional)- No  Do you feel safe in your environment? Yes    Have you ever done Advance Care Planning? (For example, a Health Directive, POLST, or a discussion with a medical provider or your loved ones about your wishes): Yes, advance care planning is on file.    Social History     Tobacco Use     Smoking status: Never Smoker     Smokeless tobacco: Never Used   Substance Use Topics     Alcohol use: No     Alcohol/week: 0.0 standard drinks     If you drink alcohol do you typically have >3  "drinks per day or >7 drinks per week? No    Alcohol Use 12/8/2021   Prescreen: >3 drinks/day or >7 drinks/week? -   Prescreen: >3 drinks/day or >7 drinks/week? No       Last PSA: No results found for: PSA    Reviewed orders with patient. Reviewed health maintenance and updated orders accordingly - Yes      Reviewed and updated as needed this visit by clinical staff  Tobacco  Allergies  Meds             Reviewed and updated as needed this visit by Provider                 **I reviewed the information recorded in the patient's EPIC chart (including but not limited to medical history, surgical history, family history, problem list, medication list, and allergy list) and updated the information as indicated based on the patients reported information.         Review of Systems  CONSTITUTIONAL: NEGATIVE for fever, chills, change in weight  INTEGUMENTARY/SKIN: NEGATIVE for worrisome rashes, moles or lesions  EYES: NEGATIVE for vision changes or irritation  ENT: NEGATIVE for ear, mouth and throat problems  RESP: NEGATIVE for significant cough or SOB  CV: NEGATIVE for chest pain, palpitations or peripheral edema  GI: NEGATIVE for nausea, abdominal pain, heartburn, or change in bowel habits   male: negative for dysuria, hematuria, decreased urinary stream, erectile dysfunction, urethral discharge  MUSCULOSKELETAL: NEGATIVE for significant arthralgias or myalgia  NEURO: NEGATIVE for weakness, dizziness or paresthesias  PSYCHIATRIC: NEGATIVE for changes in mood or affect    OBJECTIVE:   /65 (BP Location: Right arm, Cuff Size: Adult Large)   Pulse 76   Temp 97.9  F (36.6  C) (Temporal)   Resp 18   Ht 1.87 m (6' 1.62\")   Wt 103.9 kg (229 lb)   SpO2 96%   BMI 29.71 kg/m      Physical Exam  GENERAL alert and no distress  EYES:  Normal sclera,conjunctiva, EOMI  Small skin irritation just below the right eyelid.  No discrete lesions no drainage, feels like an area of either localized scratching or possible eczema " less likely.  Patient does report that he may have scratched himself in this location.  HENT: oral and posterior pharynx without lesions or erythema, facies symmetric  NECK: Neck supple. No LAD, without thyroidmegaly.  RESP: Clear to ausculation bilaterally without wheezes or crackles. Normal BS in all fields.  CV: RRR normal S1S2 without murmurs, rubs or gallops.  LYMPH: no cervical lymph adenopathy appreciated  MS: extremities- no gross deformities of the visible extremities noted,   EXT:  no lower extremity edema  PSYCH: Alert and oriented times 3; speech- coherent  SKIN:  No obvious significant skin lesions on visible portions of face     Diagnostic Test Results:  Labs reviewed in Epic    ASSESSMENT/PLAN:     (Z00.00) Encounter for routine adult health examination without abnormal findings  (primary encounter diagnosis)  Comment: Discussed cardiac disease risk factor modification including screening, preventing, and treating hypertension, elevated lipids, diabetes, and smoking cessation.    Discussed age appropriate cancer screening recommendations as dictated by age group and past medical history.    Recommended making better food choices as often as possible, including lower carb, lower fat, lower salt diet and moderation in any alcohol intake.    Recommended maintaining regular physical activity/exercise throughout their lifetime.    Annual group home exam.  No new significant issues brought up by staff.  No evidence for communicable diseases.  No contraindications to participation in current day programs.  See below for any other specific medical issues.  Reviewed current medication lists, including PRN medications.  SIgned all applicable group home documents.   Plan:     (F33.9) Episode of recurrent major depressive disorder, unspecified depression episode severity (H)  Comment: Reportedly stable on his current medications.  Continue ongoing care through psychiatry staff.  Plan: Comprehensive metabolic  "panel, Lipid panel         reflex to direct LDL Fasting, CBC with         platelets, TSH with free T4 reflex            (F39) Episodic mood disorder (H)  Comment: Reportedly stable on his current medications.  Continue ongoing care through psychiatry staff.  Plan: Comprehensive metabolic panel, Lipid panel         reflex to direct LDL Fasting, CBC with         platelets, TSH with free T4 reflex            (F84.0) Autism  Comment: Reportedly stable on his current medications.  Continue ongoing care through psychiatry staff.  Plan: Comprehensive metabolic panel, Lipid panel         reflex to direct LDL Fasting, CBC with         platelets, TSH with free T4 reflex            (Z13.6) CARDIOVASCULAR SCREENING; LDL GOAL LESS THAN 130  Comment: Discussed cardiac disease risk factors and cardiac disease risk factor modification.   Plan: Comprehensive metabolic panel, Lipid panel         reflex to direct LDL Fasting, CBC with         platelets, TSH with free T4 reflex            (Z79.899) Medication monitoring  Comment: Given his medications, typically like to monitor liver function, blood counts, lipids and screening for diabetes.  These labs are ordered today.  Plan:        Patient has been advised of split billing requirements and indicates understanding: Yes  COUNSELING:   Reviewed preventive health counseling, as reflected in patient instructions       Regular exercise       Healthy diet/nutrition       Vision screening       Immunizations    Reportedly up-to-date (nothing listed in [facility]), has been receiving Covid shot and influenza vaccines through his group home , asked them to fax us a record to update our charts.        Estimated body mass index is 29.71 kg/m  as calculated from the following:    Height as of this encounter: 1.87 m (6' 1.62\").    Weight as of this encounter: 103.9 kg (229 lb).         He reports that he has never smoked. He has never used smokeless tobacco.      Counseling Resources:  ATP IV " Guidelines  Pooled Cohorts Equation Calculator  FRAX Risk Assessment  ICSI Preventive Guidelines  Dietary Guidelines for Americans, 2010  USDA's MyPlate  ASA Prophylaxis  Lung CA Screening    Tarik Ortega MD  [facility]

## 2021-12-08 NOTE — LETTER
December 9, 2021      Hamilton Hastings  [address] [address] [address]        Dear ,    We are writing to inform you of your test results.    Your labs were all normal.      Resulted Orders   Comprehensive metabolic panel   Result Value Ref Range    Sodium 139 133 - 144 mmol/L    Potassium 4.4 3.4 - 5.3 mmol/L    Chloride 106 94 - 109 mmol/L    Carbon Dioxide (CO2) 29 20 - 32 mmol/L    Anion Gap 4 3 - 14 mmol/L    Urea Nitrogen 14 7 - 30 mg/dL    Creatinine 0.92 0.66 - 1.25 mg/dL    Calcium 9.2 8.5 - 10.1 mg/dL    Glucose 87 70 - 99 mg/dL    Alkaline Phosphatase 92 40 - 150 U/L    AST 24 0 - 45 U/L    ALT 53 0 - 70 U/L    Protein Total 7.9 6.8 - 8.8 g/dL    Albumin 4.1 3.4 - 5.0 g/dL    Bilirubin Total 1.0 0.2 - 1.3 mg/dL    GFR Estimate >90 >60 mL/min/1.73m2      Comment:      As of July 11, 2021, eGFR is calculated by the CKD-EPI creatinine equation, without race adjustment. eGFR can be influenced by muscle mass, exercise, and diet. The reported eGFR is an estimation only and is only applicable if the renal function is stable.   CBC with platelets   Result Value Ref Range    WBC Count 6.3 4.0 - 11.0 10e3/uL    RBC Count 5.37 4.40 - 5.90 10e6/uL    Hemoglobin 16.7 13.3 - 17.7 g/dL    Hematocrit 49.2 40.0 - 53.0 %    MCV 92 78 - 100 fL    MCH 31.1 26.5 - 33.0 pg    MCHC 33.9 31.5 - 36.5 g/dL    RDW 12.7 10.0 - 15.0 %    Platelet Count 233 150 - 450 10e3/uL   TSH with free T4 reflex   Result Value Ref Range    TSH 0.52 0.40 - 4.00 mU/L       If you have any questions or concerns, please call the clinic at the number listed above.       Sincerely,      Tarik Ortega MD

## 2021-12-08 NOTE — PATIENT INSTRUCTIONS
"*  Follow up with the Psychaitry providers for ongoign manageent of your psychiatric medications.     *  Return to see me in 1 year, sooner if needed.  Use [facility] or Call [phone] to schedule the appointment with me.             5 GOALS TO PREVENT FUTURE VASCULAR DISEASE:     1.  Aggressive blood pressure control, under 130/80 ideally.  Using medications if needed.    Your blood pressure is under good control    BP Readings from Last 4 Encounters:   12/08/21 103/65   09/25/20 111/70   10/10/19 100/70   06/14/19 100/60       2.  Aggressive LDL cholesterol (\"bad cholesterol\") lowering as indicated.    Your goal is an LDL under 130 for sure, preferably under 100.  (If you have diabetes or previous vascular disease, the the LDL goals would be under 100 for sure, preferably under 70.)    New guidelines identify four high-risk groups who could benefit from statins:   *people with pre-existing heart disease, such as those who have had a heart attack;   *people ages 40 to 75 who have diabetes of any type  *patients ages 40 to 75 with at least a 7.5% risk of developing cardiovascular disease over the next decade, according to a formula described in the guidelines  *patients with the sort of super-high cholesterol that sometimes runs in families, as evidenced by an LDL of 190 milligrams per deciliter or higher    Your cholesterol levels are well controlled.    Recent Labs   Lab Test 10/02/20  0923   CHOL 132   HDL 30*   LDL 65   TRIG 183*       3.  Aggressive diabetic prevention, screening and/or management.      You do not have diabetes as of the most recent blood tests.     4.  No smoking    5.  Consider daily preventative aspirin over age 50.          Preventive Health Recommendations  Male Ages 26 - 39    Yearly exam:             See your health care provider every year in order to  o   Review health changes.   o   Discuss preventive care.    o   Review your medicines if your doctor has prescribed any.    You should " "be tested each year for STDs (sexually transmitted diseases), if you re at risk.     After age 35, talk to your provider about cholesterol testing. If you are at risk for heart disease, have your cholesterol tested at least every 5 years.     If you are at risk for diabetes, you should have a diabetes test (fasting glucose).  Shots: Get a flu shot each year. Get a tetanus shot every 10 years.     Nutrition:    Eat at least 5 servings of fruits and vegetables daily.     Eat whole-grain bread, whole-wheat pasta and brown rice instead of white grains and rice.     Talk to your provider about Calcium and Vitamin D.        --Good Grains:  Oats, brown rice, Quinoa (these do not raise the blood sugar as much)     --Bad grains:  Anything made from wheat or white rice     (because these raise the blood sugars significantly, and the possible gluten issue from wheat for some people).      --Proteins:  Aim for \"lean proteins\" including chicken, fish, seafood, pork, turkey, and eggs (in moderation); Eat red meat only occasionally        Lifestyle    Exercise for at least 150 minutes a week (30 minutes a day, 5 days a week). This will help you control your weight and prevent disease.     Limit alcohol to one drink per day.     No smoking.     Wear sunscreen to prevent skin cancer.     See your dentist every six months for an exam and cleaning.       "

## 2021-12-16 LAB
CHOLEST SERPL-MCNC: 126 MG/DL
FASTING STATUS PATIENT QL REPORTED: YES
HDLC SERPL-MCNC: 33 MG/DL
LDLC SERPL CALC-MCNC: 64 MG/DL
NONHDLC SERPL-MCNC: 93 MG/DL
TRIGL SERPL-MCNC: 147 MG/DL

## 2022-02-14 ENCOUNTER — TELEPHONE (OUTPATIENT)
Dept: PEDIATRICS | Facility: CLINIC | Age: 27
End: 2022-02-14
Payer: COMMERCIAL

## 2022-02-14 DIAGNOSIS — F32.9 MAJOR DEPRESSIVE DISORDER, REMISSION STATUS UNSPECIFIED, UNSPECIFIED WHETHER RECURRENT: Primary | ICD-10-CM

## 2022-02-14 DIAGNOSIS — F32.A DEPRESSION: Primary | ICD-10-CM

## 2022-02-14 RX ORDER — BUPROPION HYDROCHLORIDE 100 MG/1
TABLET, EXTENDED RELEASE ORAL
Qty: 61 TABLET | Refills: 0 | Status: SHIPPED | OUTPATIENT
Start: 2022-02-14

## 2022-02-14 NOTE — LETTER
February 16, 2022      Hamilton Hastings  [address] [address] [facility] [address] [facility] [address]              Oliver Villasenor,    We just wanted to let you know that we sent in a refill for your citalopram but you are due for a office visit before your next refill is due.     Please give us a call at [phone] or use [facility] to schedule.     Have a great day.    Your [facility] Team

## 2022-02-14 NOTE — TELEPHONE ENCOUNTER
Refill request for Bupropion 100 mg. Pt was last seen 2 years ago on 11/12/2019 and was supposed to follow up with you 4-6  Months but did not schedule a follow up appt.    Please deny or approve medication as appropriate.     MARINO Mack on 2/14/2022 at 11:42 AM

## 2022-02-15 RX ORDER — CITALOPRAM HYDROBROMIDE 40 MG/1
TABLET ORAL
Qty: 30 TABLET | Refills: 0 | Status: SHIPPED | OUTPATIENT
Start: 2022-02-15

## 2022-02-15 NOTE — TELEPHONE ENCOUNTER
"LMTCB, when patient calls back please give the following message from the provider:     \"Needs appointment for further refills\" and help patient schedule.    Delroy Bob MA on 2/15/2022 at 2:17 PM           "

## 2022-02-15 NOTE — TELEPHONE ENCOUNTER
Pending Prescriptions:                       Disp   Refills    citalopram (CELEXA) 40 MG tablet [Pharmacy*30 tab*11       Sig: TAKE 1 TABLET BY MOUTH ONCE DAILY    Routing refill request to provider for review/approval because:  Medication is reported/historical

## 2022-05-20 ENCOUNTER — TRANSFERRED RECORDS (OUTPATIENT)
Dept: HEALTH INFORMATION MANAGEMENT | Facility: CLINIC | Age: 27
End: 2022-05-20
Payer: MEDICARE

## 2023-01-12 ENCOUNTER — OFFICE VISIT (OUTPATIENT)
Dept: INTERNAL MEDICINE | Facility: CLINIC | Age: 28
End: 2023-01-12
Payer: MEDICARE

## 2023-01-12 VITALS
SYSTOLIC BLOOD PRESSURE: 106 MMHG | HEIGHT: 73 IN | DIASTOLIC BLOOD PRESSURE: 60 MMHG | HEART RATE: 87 BPM | BODY MASS INDEX: 29.22 KG/M2 | TEMPERATURE: 98.2 F | WEIGHT: 220.5 LBS | OXYGEN SATURATION: 96 %

## 2023-01-12 DIAGNOSIS — F84.0 AUTISM: ICD-10-CM

## 2023-01-12 DIAGNOSIS — S06.9X0D TRAUMATIC BRAIN INJURY, WITHOUT LOSS OF CONSCIOUSNESS, SUBSEQUENT ENCOUNTER: ICD-10-CM

## 2023-01-12 DIAGNOSIS — Z00.00 ROUTINE GENERAL MEDICAL EXAMINATION AT A HEALTH CARE FACILITY: Primary | ICD-10-CM

## 2023-01-12 DIAGNOSIS — F41.9 ANXIETY: ICD-10-CM

## 2023-01-12 DIAGNOSIS — Z13.6 CARDIOVASCULAR SCREENING; LDL GOAL LESS THAN 130: ICD-10-CM

## 2023-01-12 DIAGNOSIS — Z23 NEED FOR TDAP VACCINATION: ICD-10-CM

## 2023-01-12 PROBLEM — F33.0 MILD EPISODE OF RECURRENT MAJOR DEPRESSIVE DISORDER (H): Status: ACTIVE | Noted: 2018-03-16

## 2023-01-12 LAB
ALT SERPL W P-5'-P-CCNC: 11 U/L (ref 10–50)
ANION GAP SERPL CALCULATED.3IONS-SCNC: 11 MMOL/L (ref 7–15)
AST SERPL W P-5'-P-CCNC: 21 U/L (ref 10–50)
BUN SERPL-MCNC: 18.5 MG/DL (ref 6–20)
CALCIUM SERPL-MCNC: 9.6 MG/DL (ref 8.6–10)
CHLORIDE SERPL-SCNC: 108 MMOL/L (ref 98–107)
CHOLEST SERPL-MCNC: 115 MG/DL
CREAT SERPL-MCNC: 0.89 MG/DL (ref 0.67–1.17)
DEPRECATED HCO3 PLAS-SCNC: 26 MMOL/L (ref 22–29)
ERYTHROCYTE [DISTWIDTH] IN BLOOD BY AUTOMATED COUNT: 12.6 % (ref 10–15)
GFR SERPL CREATININE-BSD FRML MDRD: >90 ML/MIN/1.73M2
GLUCOSE SERPL-MCNC: 90 MG/DL (ref 70–99)
HCT VFR BLD AUTO: 45.7 % (ref 40–53)
HDLC SERPL-MCNC: 26 MG/DL
HGB BLD-MCNC: 15.6 G/DL (ref 13.3–17.7)
LDLC SERPL CALC-MCNC: 58 MG/DL
MCH RBC QN AUTO: 31.5 PG (ref 26.5–33)
MCHC RBC AUTO-ENTMCNC: 34.1 G/DL (ref 31.5–36.5)
MCV RBC AUTO: 92 FL (ref 78–100)
NONHDLC SERPL-MCNC: 89 MG/DL
PLATELET # BLD AUTO: 200 10E3/UL (ref 150–450)
POTASSIUM SERPL-SCNC: 4.3 MMOL/L (ref 3.4–5.3)
RBC # BLD AUTO: 4.95 10E6/UL (ref 4.4–5.9)
SODIUM SERPL-SCNC: 145 MMOL/L (ref 136–145)
TRIGL SERPL-MCNC: 157 MG/DL
WBC # BLD AUTO: 6.8 10E3/UL (ref 4–11)

## 2023-01-12 PROCEDURE — 84460 ALANINE AMINO (ALT) (SGPT): CPT | Performed by: INTERNAL MEDICINE

## 2023-01-12 PROCEDURE — 90715 TDAP VACCINE 7 YRS/> IM: CPT | Performed by: INTERNAL MEDICINE

## 2023-01-12 PROCEDURE — 85027 COMPLETE CBC AUTOMATED: CPT | Performed by: INTERNAL MEDICINE

## 2023-01-12 PROCEDURE — 84450 TRANSFERASE (AST) (SGOT): CPT | Performed by: INTERNAL MEDICINE

## 2023-01-12 PROCEDURE — 90471 IMMUNIZATION ADMIN: CPT | Performed by: INTERNAL MEDICINE

## 2023-01-12 PROCEDURE — 99395 PREV VISIT EST AGE 18-39: CPT | Mod: 25 | Performed by: INTERNAL MEDICINE

## 2023-01-12 PROCEDURE — 99214 OFFICE O/P EST MOD 30 MIN: CPT | Mod: 25 | Performed by: INTERNAL MEDICINE

## 2023-01-12 PROCEDURE — 80048 BASIC METABOLIC PNL TOTAL CA: CPT | Performed by: INTERNAL MEDICINE

## 2023-01-12 PROCEDURE — 36415 COLL VENOUS BLD VENIPUNCTURE: CPT | Performed by: INTERNAL MEDICINE

## 2023-01-12 PROCEDURE — 80061 LIPID PANEL: CPT | Performed by: INTERNAL MEDICINE

## 2023-01-12 ASSESSMENT — ENCOUNTER SYMPTOMS
SORE THROAT: 0
HEMATURIA: 0
DIZZINESS: 0
SHORTNESS OF BREATH: 0
DYSURIA: 0
ARTHRALGIAS: 0
HEARTBURN: 0
DIARRHEA: 0
FEVER: 0
HEMATOCHEZIA: 0
PARESTHESIAS: 0
PALPITATIONS: 0
MYALGIAS: 0
COUGH: 0
CHILLS: 0
NAUSEA: 0
ABDOMINAL PAIN: 0
CONSTIPATION: 0
EYE PAIN: 0
JOINT SWELLING: 0
NERVOUS/ANXIOUS: 1
HEADACHES: 0
FREQUENCY: 0
WEAKNESS: 0

## 2023-01-12 ASSESSMENT — PATIENT HEALTH QUESTIONNAIRE - PHQ9
SUM OF ALL RESPONSES TO PHQ QUESTIONS 1-9: 3
SUM OF ALL RESPONSES TO PHQ QUESTIONS 1-9: 3
10. IF YOU CHECKED OFF ANY PROBLEMS, HOW DIFFICULT HAVE THESE PROBLEMS MADE IT FOR YOU TO DO YOUR WORK, TAKE CARE OF THINGS AT HOME, OR GET ALONG WITH OTHER PEOPLE: NOT DIFFICULT AT ALL

## 2023-01-12 ASSESSMENT — PAIN SCALES - GENERAL: PAINLEVEL: NO PAIN (0)

## 2023-01-12 NOTE — PATIENT INSTRUCTIONS
" Tetanus vaccine given today.      Schedule a covid vaccine at your convenience at the Covid vaccine clinic (bring signed permission slip)  [facility] Covid Scheduling number: [phone]   (to arrange Covid testing and/or Covid vaccine appointments)        Continue all medications at the same doses.  Contact your usual pharmacy if you need refills.       Return to see me in 1 year, sooner if needed.  Use [employer] [facility] or Call [phone] to schedule the appointment with me.         Preventive Health Recommendations  Male Ages 26 - 39    Yearly exam:             See your health care provider every year in order to  o   Review health changes.   o   Discuss preventive care.    o   Review your medicines if your doctor has prescribed any.  You should be tested each year for STDs (sexually transmitted diseases), if you re at risk.   After age 35, talk to your provider about cholesterol testing. If you are at risk for heart disease, have your cholesterol tested at least every 5 years.   If you are at risk for diabetes, you should have a diabetes test (fasting glucose).  Shots: Get a flu shot each year. Get a tetanus shot every 10 years.     Nutrition:  Eat at least 5 servings of fruits and vegetables daily.   Eat whole-grain bread, whole-wheat pasta and brown rice instead of white grains and rice.   Get adequate Calcium and Vitamin D.        --Good Grains:  Oats, brown rice, Quinoa (these do not raise the blood sugar as much)     --Bad grains:  Anything made from wheat or white rice     (because these raise the blood sugars significantly, and the possible gluten issue from wheat for some people).      --Proteins:  Aim for \"lean proteins\" including chicken, fish, seafood, pork, turkey, and eggs (in moderation); Eat red meat only occasionally    Lifestyle  Exercise for at least 150 minutes a week (30 minutes a day, 5 days a week). This will help you control your weight and prevent disease.   Limit alcohol to one drink " per day.   No smoking.   Wear sunscreen to prevent skin cancer.   See your dentist every six months for an exam and cleaning.

## 2023-01-12 NOTE — PROGRESS NOTES
SUBJECTIVE:   CC: Hamilton is an 27 year old who presents for preventative health visit.   Patient has been advised of split billing requirements and indicates understanding: Yes  Healthy Habits:     Getting at least 3 servings of Calcium per day:  Yes    Bi-annual eye exam:  Yes    Dental care twice a year:  Yes    Sleep apnea or symptoms of sleep apnea:  None    Diet:  Regular (no restrictions)    Frequency of exercise:  2-3 days/week    Duration of exercise:  15-30 minutes    Taking medications regularly:  Yes    Barriers to taking medications:  Other    Medication side effects:  None    PHQ-2 Total Score: 0    Additional concerns today:  Yes    1.  Ongoing mental health issues with autism, prior history of TBI.   staff reports ongoing anxiety.   On numerous meds through his psychiatry team.   Mother asking about herbal meds for anxiety management.         Today's PHQ-2 Score:   PHQ-2 ( 1999 [employer]) 1/12/2023   Q1: Little interest or pleasure in doing things 0   Q2: Feeling down, depressed or hopeless 0   PHQ-2 Score 0   PHQ-2 Total Score (12-17 Years)- Positive if 3 or more points; Administer PHQ-A if positive -   Q1: Little interest or pleasure in doing things Not at all   Q2: Feeling down, depressed or hopeless Not at all   PHQ-2 Score 0   Answers for HPI/ROS submitted by the patient on 1/12/2023  If you checked off any problems, how difficult have these problems made it for you to do your work, take care of things at home, or get along with other people?: Not difficult at all  PHQ9 TOTAL SCORE: 3    Social History     Tobacco Use     Smoking status: Never     Smokeless tobacco: Never   Substance Use Topics     Alcohol use: No     Alcohol/week: 0.0 standard drinks     If you drink alcohol do you typically have >3 drinks per day or >7 drinks per week? Not applicable    Alcohol Use 1/12/2023   Prescreen: >3 drinks/day or >7 drinks/week? No   Prescreen: >3 drinks/day or >7 drinks/week? -       Last PSA: No results  found for: PSA    Reviewed orders with patient. Reviewed health maintenance and updated orders accordingly - Yes      Reviewed and updated as needed this visit by clinical staff   Tobacco  Allergies  Meds              Reviewed and updated as needed this visit by Provider                   **I reviewed the information recorded in the patient's EPIC chart (including but not limited to medical history, surgical history, family history, problem list, medication list, and allergy list) and updated the information as indicated based on the patients reported information.         Review of Systems   Constitutional: Negative for chills and fever.   HENT: Negative for congestion, ear pain, hearing loss and sore throat.    Eyes: Negative for pain and visual disturbance.   Respiratory: Negative for cough and shortness of breath.    Cardiovascular: Negative for chest pain, palpitations and peripheral edema.   Gastrointestinal: Negative for abdominal pain, constipation, diarrhea, heartburn, hematochezia and nausea.   Genitourinary: Negative for dysuria, frequency, genital sores, hematuria, impotence, penile discharge and urgency.   Musculoskeletal: Negative for arthralgias, joint swelling and myalgias.   Skin: Negative for rash.   Neurological: Negative for dizziness, weakness, headaches and paresthesias.   Psychiatric/Behavioral: Positive for mood changes. The patient is nervous/anxious.      CONSTITUTIONAL: NEGATIVE for fever, chills, change in weight  INTEGUMENTARY/SKIN: NEGATIVE for worrisome rashes, moles or lesions  EYES: NEGATIVE for vision changes or irritation  ENT: NEGATIVE for ear, mouth and throat problems  RESP: NEGATIVE for significant cough or SOB  CV: NEGATIVE for chest pain, palpitations or peripheral edema  GI: NEGATIVE for nausea, abdominal pain, heartburn, or change in bowel habits   male: negative for dysuria, hematuria, decreased urinary stream, erectile dysfunction, urethral discharge  MUSCULOSKELETAL:  "NEGATIVE for significant arthralgias or myalgia  NEURO: NEGATIVE for weakness, dizziness or paresthesias  PSYCHIATRIC: NEGATIVE for changes in mood or affect    OBJECTIVE:   /60   Pulse 87   Temp 98.2  F (36.8  C) (Oral)   Ht 1.854 m (6' 1\")   Wt 100 kg (220 lb 8 oz)   SpO2 96%   BMI 29.09 kg/m      Physical Exam  GENERAL alert and no distress  EYES:  Normal sclera,conjunctiva, EOMI  HENT: oral and posterior pharynx without lesions or erythema, facies symmetric  NECK: Neck supple. No LAD, without thyroidmegaly.  RESP: Clear to ausculation bilaterally without wheezes or crackles. Normal BS in all fields.  CV: RRR normal S1S2 without murmurs, rubs or gallops.  LYMPH: no cervical lymph adenopathy appreciated  MS: extremities- no gross deformities of the visible extremities noted,   EXT:  no lower extremity edema  PSYCH: Alert and oriented times 3; speech- coherent  SKIN:  No obvious significant skin lesions on visible portions of face     Diagnostic Test Results:  Labs reviewed in Epic    ASSESSMENT/PLAN:     (Z00.00) Routine general medical examination at a health care facility  (primary encounter diagnosis)  Comment: Discussed cardiac disease risk factor modification including screening, preventing, and treating hypertension, elevated lipids, diabetes, and smoking cessation.    Discussed age appropriate cancer screening recommendations as dictated by age group and past medical history.    Recommended making better food choices as often as possible, including lower carb, lower fat, lower salt diet and moderation in any alcohol intake.    Recommended maintaining regular physical activity/exercise throughout their lifetime.  Recommended safety and injury prevention (i.e. seatbelt use, safety equipment like helmets when biking, etc).    Annual group home exam.  Reviewed issues brought up by staff.  NO evidence for communicable diseases.  No contraindications to participation in current day programs.  See " below for any other specific medical issues.  Reviewed current medication lists, including PRN medications.  SIgned all applicable group home documents.   Plan: REVIEW OF HEALTH MAINTENANCE PROTOCOL ORDERS            (Z13.6) CARDIOVASCULAR SCREENING; LDL GOAL LESS THAN 130  Comment: Discussed cardiac disease risk factors and cardiac disease risk factor modification.   Plan: REVIEW OF HEALTH MAINTENANCE PROTOCOL ORDERS,         CBC with platelets, Lipid panel reflex to         direct LDL Fasting, Basic metabolic panel, AST,        ALT            (S06.9X0D) Traumatic brain injury, without loss of consciousness, subsequent encounter  Comment: Known issue that I take into account for their medical decisions; no current exacerbations, or new concerns.  This condition is currently controlled on the current medical regimen.  Continue current therapy.   Plan:     (F84.0) Autism  Comment: Known issue that I take into account for their medical decisions; no current exacerbations, or new concerns.  This condition is currently controlled on the current medical regimen.  Continue current therapy.   Plan:     (F41.9) Anxiety  Comment: Known issue that I take into account for their medical decisions; no current exacerbations, or new concerns.  This condition is currently controlled on the current medical regimen.  Continue current therapy.   Plan:     (Z23) Need for Tdap vaccination  Comment:   Plan: REVIEW OF HEALTH MAINTENANCE PROTOCOL ORDERS,         TDAP VACCINE (Adacel, Boostrix)               Patient has been advised of split billing requirements and indicates understanding: Yes      COUNSELING:   Reviewed preventive health counseling, as reflected in patient instructions       Regular exercise       Healthy diet/nutrition       Vision screening       Hearing screening       Immunizations    Vaccinated for: TDAP      Recommend covid bosoter, but cannot give today until he has consent signed by guardina, return to [facility] at their convenience             He reports that he has never smoked. He has never used smokeless tobacco.            Tarik Ortega MD  [facility]

## 2023-09-15 ENCOUNTER — TELEPHONE (OUTPATIENT)
Dept: INTERNAL MEDICINE | Facility: CLINIC | Age: 28
End: 2023-09-15
Payer: MEDICARE

## 2023-09-15 NOTE — TELEPHONE ENCOUNTER
Forms/Letter Request    Type of form/letter:  [employer]    Have you been seen for this request: Yes 1/12/23 establish care/physical    Do we have the form/letter: Yes: placed in providers folder at [facility]    Who is the form from?  Mother/Guardian Soraida Hastings  Where did/will the form come from? form was faxed in    When is form/letter needed by: as soon as it can be completed    How would you like the form/letter returned:  call Soraida when form completed and she will  from office.     Patient Notified form requests are processed in 3-5 business days:No    Okay to leave a detailed message?: Yes Soraida Hastings @[phone]

## 2023-09-19 NOTE — TELEPHONE ENCOUNTER
Form completed, left at [facility].   OK to return to the patient/family.   Please make a copy for the chart before returning to the patient.    Close encounter when done.

## 2024-01-29 ENCOUNTER — OFFICE VISIT (OUTPATIENT)
Dept: FAMILY MEDICINE | Facility: CLINIC | Age: 29
End: 2024-01-29
Payer: MEDICARE

## 2024-01-29 VITALS
HEIGHT: 73 IN | DIASTOLIC BLOOD PRESSURE: 67 MMHG | SYSTOLIC BLOOD PRESSURE: 102 MMHG | OXYGEN SATURATION: 98 % | RESPIRATION RATE: 17 BRPM | BODY MASS INDEX: 27.57 KG/M2 | HEART RATE: 90 BPM | TEMPERATURE: 98.3 F | WEIGHT: 208 LBS

## 2024-01-29 DIAGNOSIS — E66.3 OVERWEIGHT (BMI 25.0-29.9): ICD-10-CM

## 2024-01-29 DIAGNOSIS — H61.21 EXCESSIVE EAR WAX, RIGHT: ICD-10-CM

## 2024-01-29 DIAGNOSIS — F84.0 AUTISM: Primary | ICD-10-CM

## 2024-01-29 DIAGNOSIS — Z13.220 SCREENING FOR HYPERLIPIDEMIA: ICD-10-CM

## 2024-01-29 DIAGNOSIS — S06.9X0D TRAUMATIC BRAIN INJURY, WITHOUT LOSS OF CONSCIOUSNESS, SUBSEQUENT ENCOUNTER: ICD-10-CM

## 2024-01-29 DIAGNOSIS — Z00.00 ROUTINE GENERAL MEDICAL EXAMINATION AT A HEALTH CARE FACILITY: ICD-10-CM

## 2024-01-29 DIAGNOSIS — Z00.00 ENCOUNTER FOR MEDICARE ANNUAL WELLNESS EXAM: ICD-10-CM

## 2024-01-29 DIAGNOSIS — F33.0 MILD EPISODE OF RECURRENT MAJOR DEPRESSIVE DISORDER (H): ICD-10-CM

## 2024-01-29 PROCEDURE — 36415 COLL VENOUS BLD VENIPUNCTURE: CPT | Performed by: INTERNAL MEDICINE

## 2024-01-29 PROCEDURE — 80061 LIPID PANEL: CPT | Performed by: INTERNAL MEDICINE

## 2024-01-29 PROCEDURE — G0439 PPPS, SUBSEQ VISIT: HCPCS | Performed by: INTERNAL MEDICINE

## 2024-01-29 PROCEDURE — 99213 OFFICE O/P EST LOW 20 MIN: CPT | Mod: 25 | Performed by: INTERNAL MEDICINE

## 2024-01-29 PROCEDURE — 80048 BASIC METABOLIC PNL TOTAL CA: CPT | Performed by: INTERNAL MEDICINE

## 2024-01-29 RX ORDER — ARIPIPRAZOLE 20 MG/1
10 TABLET ORAL 2 TIMES DAILY
Status: CANCELLED | OUTPATIENT
Start: 2024-01-29

## 2024-01-29 RX ORDER — BUPROPION HYDROCHLORIDE 100 MG/1
100 TABLET, EXTENDED RELEASE ORAL 2 TIMES DAILY
Qty: 61 TABLET | Refills: 0 | Status: CANCELLED | OUTPATIENT
Start: 2024-01-29

## 2024-01-29 ASSESSMENT — ENCOUNTER SYMPTOMS
NERVOUS/ANXIOUS: 1
COUGH: 0
HEADACHES: 0
DYSURIA: 0
WEAKNESS: 0
CONSTIPATION: 0
PALPITATIONS: 0
MYALGIAS: 0
ARTHRALGIAS: 0
FEVER: 0
EYE PAIN: 0
JOINT SWELLING: 0
ABDOMINAL PAIN: 0
DIZZINESS: 0
HEMATOCHEZIA: 0
FREQUENCY: 0
SORE THROAT: 0
HEMATURIA: 0
DIARRHEA: 0
CHILLS: 0
NAUSEA: 0
SHORTNESS OF BREATH: 0
HEARTBURN: 0
PARESTHESIAS: 0

## 2024-01-29 ASSESSMENT — PAIN SCALES - GENERAL: PAINLEVEL: NO PAIN (0)

## 2024-01-29 ASSESSMENT — PATIENT HEALTH QUESTIONNAIRE - PHQ9
SUM OF ALL RESPONSES TO PHQ QUESTIONS 1-9: 8
SUM OF ALL RESPONSES TO PHQ QUESTIONS 1-9: 8
10. IF YOU CHECKED OFF ANY PROBLEMS, HOW DIFFICULT HAVE THESE PROBLEMS MADE IT FOR YOU TO DO YOUR WORK, TAKE CARE OF THINGS AT HOME, OR GET ALONG WITH OTHER PEOPLE: NOT DIFFICULT AT ALL

## 2024-01-29 ASSESSMENT — ACTIVITIES OF DAILY LIVING (ADL): CURRENT_FUNCTION: NO ASSISTANCE NEEDED

## 2024-01-29 NOTE — LETTER
January 30, 2024      Hamilton Hastings  [address] [address] [address]      Mr. Hastings,   Kidney function tests are normal   Your LDL (bad cholesterol)  was at goal. Your HDL (good cholesterol) was below my goal for you (>45 in men and > 55 in women).  Genetics and inactivity can contribute to this. Your triglycerides were above normal.  Poor diet, genetics and being overweight can contribute to this.  1000mg daily of omega-3 fatty acids may improve this. Maintaining a healthy diet with lean proteins, whole grains and healthy fats such as olive oil as well as regular exercise and maintaining an appropriate weight all contribute to healthier cholesterol levels.     Resulted Orders   Lipid panel reflex to direct LDL Fasting   Result Value Ref Range    Cholesterol 135 <200 mg/dL    Triglycerides 296 (H) <150 mg/dL    Direct Measure HDL 24 (L) >=40 mg/dL    LDL Cholesterol Calculated 52 <=100 mg/dL    Non HDL Cholesterol 111 <130 mg/dL    Patient Fasting > 8hrs? No     Narrative    Cholesterol  Desirable:  <200 mg/dL    Triglycerides  Normal:  Less than 150 mg/dL  Borderline High:  150-199 mg/dL  High:  200-499 mg/dL  Very High:  Greater than or equal to 500 mg/dL    Direct Measure HDL  Female:  Greater than or equal to 50 mg/dL   Male:  Greater than or equal to 40 mg/dL    LDL Cholesterol  Desirable:  <100mg/dL  Above Desirable:  100-129 mg/dL   Borderline High:  130-159 mg/dL   High:  160-189 mg/dL   Very High:  >= 190 mg/dL    Non HDL Cholesterol  Desirable:  130 mg/dL  Above Desirable:  130-159 mg/dL  Borderline High:  160-189 mg/dL  High:  190-219 mg/dL  Very High:  Greater than or equal to 220 mg/dL   Basic metabolic panel  (Ca, Cl, CO2, Creat, Gluc, K, Na, BUN)   Result Value Ref Range    Sodium 140 135 - 145 mmol/L      Comment:      Reference intervals for this test were updated on 09/26/2023 to more accurately reflect our healthy population. There may be differences in the flagging of prior  results with similar values performed with this method. Interpretation of those prior results can be made in the context of the updated reference intervals.     Potassium 4.3 3.4 - 5.3 mmol/L    Chloride 104 98 - 107 mmol/L    Carbon Dioxide (CO2) 27 22 - 29 mmol/L    Anion Gap 9 7 - 15 mmol/L    Urea Nitrogen 15.5 6.0 - 20.0 mg/dL    Creatinine 0.88 0.67 - 1.17 mg/dL    GFR Estimate >90 >60 mL/min/1.73m2    Calcium 9.5 8.6 - 10.0 mg/dL    Glucose 88 70 - 99 mg/dL       If you have any questions or concerns, please call the clinic at the number listed above.       Sincerely,      Gunnar Espino MD

## 2024-01-29 NOTE — PROGRESS NOTES
"Preventive Care Visit  [facility]  Gunnar Espino MD, Internal Medicine  Jan 29, 2024      SUBJECTIVE:   Hamilton is a 28 year old, presenting for the following:  Physical        1/29/2024    11:26 AM   Additional Questions   Roomed by Olive LOUIE   Accompanied by Self         1/29/2024    11:26 AM   Patient Reported Additional Medications   Patient reports taking the following new medications None     Healthy Habits:     In general, how would you rate your overall health?  Excellent    Frequency of exercise:  2-3 days/week    Duration of exercise:  15-30 minutes    Do you usually eat at least 4 servings of fruit and vegetables a day, include whole grains    & fiber and avoid regularly eating high fat or \"junk\" foods?  Yes    Taking medications regularly:  Yes    Medication side effects:  None    Ability to successfully perform activities of daily living:  No assistance needed    Home Safety:  No safety concerns identified    Hearing Impairment:  No hearing concerns    In the past 6 months, have you been bothered by leaking of urine?  No    In general, how would you rate your overall mental or emotional health?  Fair    Additional concerns today:  No    Today's PHQ-9 Score:       1/29/2024    11:03 AM   PHQ-9 SCORE   PHQ-9 Total Score MyChart 8 (Mild depression)   PHQ-9 Total Score 8                     Social History     Tobacco Use    Smoking status: Never    Smokeless tobacco: Never   Substance Use Topics    Alcohol use: No     Alcohol/week: 0.0 standard drinks of alcohol             1/29/2024    11:12 AM   Alcohol Use   Prescreen: >3 drinks/day or >7 drinks/week? Not Applicable          No data to display                Last PSA: No results found for: \"PSA\"    Reviewed orders with patient. Reviewed health maintenance and updated orders accordingly - Yes  Lab work is in process    Reviewed and updated as needed this visit by clinical staff                  Reviewed and updated as needed this " "visit by Provider                    Review of Systems   Constitutional:  Negative for chills and fever.   HENT:  Negative for congestion, ear pain, hearing loss and sore throat.    Eyes:  Negative for pain and visual disturbance.   Respiratory:  Negative for cough and shortness of breath.    Cardiovascular:  Negative for chest pain and palpitations.   Gastrointestinal:  Negative for abdominal pain, constipation, diarrhea and nausea.   Genitourinary:  Negative for dysuria, frequency, genital sores, hematuria, penile discharge and urgency.   Musculoskeletal:  Negative for arthralgias, joint swelling and myalgias.   Skin:  Negative for rash.   Neurological:  Negative for dizziness, weakness and headaches.   Psychiatric/Behavioral:  The patient is nervous/anxious.          OBJECTIVE:   There were no vitals taken for this visit.   Estimated body mass index is 29.09 kg/m  as calculated from the following:    Height as of 1/12/23: 1.854 m (6' 1\").    Weight as of 1/12/23: 100 kg (220 lb 8 oz).  Physical Exam  GENERAL: alert and no distress  EYES: Eyes grossly normal to inspection, PERRL and conjunctivae and sclerae normal  HENT: wax in right ear  Left ear clear  NECK: no adenopathy, no asymmetry, masses, or scars  RESP: lungs clear to auscultation - no rales, rhonchi or wheezes  CV: regular rate and rhythm, normal S1 S2, no S3 or S4, no murmur, click or rub, no peripheral edema  ABDOMEN: soft, nontender, no hepatosplenomegaly, no masses and bowel sounds normal  MS: no gross musculoskeletal defects noted, no edema  SKIN: no suspicious lesions or rashes  NEURO: Normal strength and tone, mentation intact and speech normal  PSYCH: mentation appears normal, affect normal/bright  LYMPH: no cervical, supraclavicular, axillary, or inguinal adenopathy    Diagnostic Test Results:  Labs reviewed in Epic    ASSESSMENT/PLAN:   Autism  Follows up with psychiatrist  He is on Celexa 40 mg/Abilify 10 mg twice a day/bupropion  mg " twice a day    Traumatic brain injury, without loss of consciousness, subsequent encounter  The history about it is unclear  Patient tells me this happened a long time ago  Denies any current headaches    Mild episode of recurrent major depressive disorder (H24)  Follows up with psychiatrist as stated above he is on Celexa/Abilify/bupropion  - OFFICE/OUTPT VISIT,EST,LEVL IV    Overweight (BMI 25.0-29.9)  Discussed about diet and exercise    Routine general medical examination at a health care facility  Updated all the vaccines except hepatitis B which she thinks he might have had in the past as we do not have all his vaccine records  He tells me he actually spent a lot of time in [place] and that is where he was born  - Basic metabolic panel  (Ca, Cl, CO2, Creat, Gluc, K, Na, BUN); Future  - Basic metabolic panel  (Ca, Cl, CO2, Creat, Gluc, K, Na, BUN)    Screening for hyperlipidemia    - Lipid panel reflex to direct LDL Fasting; Future  - Lipid panel reflex to direct LDL Fasting    Encounter for Medicare annual wellness exam      Excessive ear wax, right  He has excessive earwax in right ear we discussed about getting it irrigated today however they did not have time so I prescribed some carbamide eardrops  - carbamide peroxide (DEBROX) 6.5 % otic solution; Place 5 drops into the right ear 2 times daily For 1 week  - OFFICE/OUTPT VISIT,EST,LEVL IV    Patient has been advised of split billing requirements and indicates understanding: No      Counseling  Reviewed preventive health counseling, as reflected in patient instructions       Regular exercise       Healthy diet/nutrition       Vision screening       Hearing screening        He reports that he has never smoked. He has never used smokeless tobacco.            Signed Electronically by: Gunnar Espino MD  Answers submitted by the patient for this visit:  Patient Health Questionnaire (Submitted on 1/29/2024)  If you checked off any problems, how difficult have  these problems made it for you to do your work, take care of things at home, or get along with other people?: Not difficult at all  PHQ9 TOTAL SCORE: 8  Annual Preventive Visit (Submitted on 1/29/2024)  Chief Complaint: Annual Exam:  Blood in stool: No  heartburn: No  peripheral edema: No  mood changes: No  Skin sensation changes: No  impotence: No

## 2024-01-29 NOTE — PATIENT INSTRUCTIONS
Patient Education   Personalized Prevention Plan  You are due for the preventive services outlined below.  Your care team is available to assist you in scheduling these services.  If you have already completed any of these items, please share that information with your care team to update in your medical record.  Health Maintenance Due   Topic Date Due     Hepatitis B Vaccine (1 of 3 - 3-dose series) Never done     Annual Wellness Visit  01/12/2024     Your Health Risk Assessment indicates you feel you are not in good emotional health.    Recreation   Recreation is not limited to sports and team events. It includes any activity that provides relaxation, interest, enjoyment, and exercise. Recreation provides an outlet for physical, mental, and social energy. It can give a sense of worth and achievement. It can help you stay healthy.    Mental Exercise and Social Involvement  Mental and emotional health is as important as physical health. Keep in touch with friends and family. Stay as active as possible. Continue to learn and challenge yourself.   Things you can do to stay mentally active are:  Learn something new, like a foreign language or musical instrument.   Play SCRABBLE or do crossword puzzles. If you cannot find people to play these games with you at home, you can play them with others on your computer through the Internet.   Join a games club--anything from card games to chess or checkers or lawn bowling.   Start a new hobby.   Go back to school.   Volunteer.   Read.   Keep up with world events.  Learning About Depression Screening  What is depression screening?  Depression screening is a way to see if you have depression symptoms. It may be done by a doctor or counselor. It's often part of a routine checkup. That's because your mental health is just as important as your physical health.  Depression is a mental health condition that affects how you feel, think, and act. You may:  Have less energy.  Lose  "interest in your daily activities.  Feel sad and grouchy for a long time.  Depression is very common. It affects people of all ages.  Many things can lead to depression. Some people become depressed after they have a stroke or find out they have a major illness like cancer or heart disease. The death of a loved one or a breakup may lead to depression. It can run in families. Most experts believe that a combination of inherited genes and stressful life events can cause it.  What happens during screening?  You may be asked to fill out a form about your depression symptoms. You and the doctor will discuss your answers. The doctor may ask you more questions to learn more about how you think, act, and feel.  What happens after screening?  If you have symptoms of depression, your doctor will talk to you about your options.  Doctors usually treat depression with medicines or counseling. Often, combining the two works best. Many people don't get help because they think that they'll get over the depression on their own. But people with depression may not get better unless they get treatment.  The cause of depression is not well understood. There may be many factors involved. But if you have depression, it's not your fault.  A serious symptom of depression is thinking about death or suicide. If you or someone you care about talks about this or about feeling hopeless, get help right away.  It's important to know that depression can be treated. Medicine, counseling, and self-care may help.  Where can you learn more?  Go to [email or web address]/patiented  Enter T185 in the search box to learn more about \"Learning About Depression Screening.\"  Current as of: June 25, 2023               Content Version: 13.8    2736-1742 [employer].   Care instructions adapted under license by your healthcare professional. If you have questions about a medical condition or this instruction, always ask your healthcare " professional. [employer] disclaims any warranty or liability for your use of this information.

## 2024-01-29 NOTE — LETTER
January 30, 2024      Hamilton Hastings  [address] [address]  [place] [address]        Dear ,    We are writing to inform you of your test results.      Resulted Orders   Lipid panel reflex to direct LDL Fasting   Result Value Ref Range    Cholesterol 135 <200 mg/dL    Triglycerides 296 (H) <150 mg/dL    Direct Measure HDL 24 (L) >=40 mg/dL    LDL Cholesterol Calculated 52 <=100 mg/dL    Non HDL Cholesterol 111 <130 mg/dL    Patient Fasting > 8hrs? No     Narrative    Cholesterol  Desirable:  <200 mg/dL    Triglycerides  Normal:  Less than 150 mg/dL  Borderline High:  150-199 mg/dL  High:  200-499 mg/dL  Very High:  Greater than or equal to 500 mg/dL    Direct Measure HDL  Female:  Greater than or equal to 50 mg/dL   Male:  Greater than or equal to 40 mg/dL    LDL Cholesterol  Desirable:  <100mg/dL  Above Desirable:  100-129 mg/dL   Borderline High:  130-159 mg/dL   High:  160-189 mg/dL   Very High:  >= 190 mg/dL    Non HDL Cholesterol  Desirable:  130 mg/dL  Above Desirable:  130-159 mg/dL  Borderline High:  160-189 mg/dL  High:  190-219 mg/dL  Very High:  Greater than or equal to 220 mg/dL   Basic metabolic panel  (Ca, Cl, CO2, Creat, Gluc, K, Na, BUN)   Result Value Ref Range    Sodium 140 135 - 145 mmol/L      Comment:      Reference intervals for this test were updated on 09/26/2023 to more accurately reflect our healthy population. There may be differences in the flagging of prior results with similar values performed with this method. Interpretation of those prior results can be made in the context of the updated reference intervals.     Potassium 4.3 3.4 - 5.3 mmol/L    Chloride 104 98 - 107 mmol/L    Carbon Dioxide (CO2) 27 22 - 29 mmol/L    Anion Gap 9 7 - 15 mmol/L    Urea Nitrogen 15.5 6.0 - 20.0 mg/dL    Creatinine 0.88 0.67 - 1.17 mg/dL    GFR Estimate >90 >60 mL/min/1.73m2    Calcium 9.5 8.6 - 10.0 mg/dL    Glucose 88 70 - 99 mg/dL       If you have any questions or concerns, please  call the clinic at the number listed above.       Sincerely,      Gunnar Espino MD

## 2024-01-29 NOTE — PROGRESS NOTES
"The patient was provided with suggestions to help him develop a healthy emotional lifestyle.  The patient's PHQ-9 score is consistent with mild depression. He was provided with information regarding depression and was advised to schedule a follow up appointment in 53 weeks to further address this issue.  Answers submitted by the patient for this visit:  Patient Health Questionnaire (Submitted on 1/29/2024)  If you checked off any problems, how difficult have these problems made it for you to do your work, take care of things at home, or get along with other people?: Not difficult at all  PHQ9 TOTAL SCORE: 8  Annual Preventive Visit (Submitted on 1/29/2024)  Chief Complaint: Annual Exam:  In general, how would you rate your overall physical health?: excellent  Frequency of exercise:: 2-3 days/week  Do you usually eat at least 4 servings of fruit and vegetables a day, include whole grains & fiber, and avoid regularly eating high fat or \"junk\" foods? : Yes  Taking medications regularly:: Yes  Medication side effects:: None  Activities of Daily Living: no assistance needed  Home safety: no safety concerns identified  Hearing Impairment:: no hearing concerns  In the past 6 months, have you been bothered by leaking of urine?: No  abdominal pain: No  Blood in stool: No  Blood in urine: No  chest pain: No  chills: No  congestion: No  constipation: No  cough: No  diarrhea: No  dizziness: No  ear pain: No  eye pain: No  nervous/anxious: Yes  fever: No  frequency: No  genital sores: No  headaches: No  hearing loss: No  heartburn: No  arthralgias: No  joint swelling: No  peripheral edema: No  mood changes: No  myalgias: No  nausea: No  dysuria: No  palpitations: No  Skin sensation changes: No  sore throat: No  urgency: No  rash: No  shortness of breath: No  visual disturbance: No  weakness: No  impotence: No  penile discharge: No  In general, how would you rate your overall mental or emotional health?: fair  Additional concerns " today:: No  Exercise outside of work (Submitted on 1/29/2024)  Chief Complaint: Annual Exam:  Duration of exercise:: 15-30 minutes

## 2024-01-30 LAB
ANION GAP SERPL CALCULATED.3IONS-SCNC: 9 MMOL/L (ref 7–15)
BUN SERPL-MCNC: 15.5 MG/DL (ref 6–20)
CALCIUM SERPL-MCNC: 9.5 MG/DL (ref 8.6–10)
CHLORIDE SERPL-SCNC: 104 MMOL/L (ref 98–107)
CHOLEST SERPL-MCNC: 135 MG/DL
CREAT SERPL-MCNC: 0.88 MG/DL (ref 0.67–1.17)
DEPRECATED HCO3 PLAS-SCNC: 27 MMOL/L (ref 22–29)
EGFRCR SERPLBLD CKD-EPI 2021: >90 ML/MIN/1.73M2
FASTING STATUS PATIENT QL REPORTED: NO
GLUCOSE SERPL-MCNC: 88 MG/DL (ref 70–99)
HDLC SERPL-MCNC: 24 MG/DL
LDLC SERPL CALC-MCNC: 52 MG/DL
NONHDLC SERPL-MCNC: 111 MG/DL
POTASSIUM SERPL-SCNC: 4.3 MMOL/L (ref 3.4–5.3)
SODIUM SERPL-SCNC: 140 MMOL/L (ref 135–145)
TRIGL SERPL-MCNC: 296 MG/DL

## 2024-04-17 ENCOUNTER — TRANSFERRED RECORDS (OUTPATIENT)
Dept: HEALTH INFORMATION MANAGEMENT | Facility: CLINIC | Age: 29
End: 2024-04-17

## 2024-05-21 DIAGNOSIS — J30.2 SEASONAL ALLERGIC RHINITIS, UNSPECIFIED TRIGGER: Primary | ICD-10-CM

## 2024-05-21 RX ORDER — CETIRIZINE HYDROCHLORIDE 10 MG/1
TABLET ORAL
Qty: 30 TABLET | Refills: 11 | Status: SHIPPED | OUTPATIENT
Start: 2024-05-21

## 2024-07-17 ENCOUNTER — OFFICE VISIT (OUTPATIENT)
Dept: AUDIOLOGY | Facility: CLINIC | Age: 29
End: 2024-07-17
Payer: MEDICARE

## 2024-07-17 ENCOUNTER — OFFICE VISIT (OUTPATIENT)
Dept: OTOLARYNGOLOGY | Facility: CLINIC | Age: 29
End: 2024-07-17
Payer: MEDICARE

## 2024-07-17 DIAGNOSIS — H61.23 BILATERAL IMPACTED CERUMEN: ICD-10-CM

## 2024-07-17 DIAGNOSIS — H61.23 BILATERAL IMPACTED CERUMEN: Primary | ICD-10-CM

## 2024-07-17 DIAGNOSIS — H93.293 ABNORMAL AUDITORY PERCEPTION, BILATERAL: Primary | ICD-10-CM

## 2024-07-17 PROCEDURE — 92557 COMPREHENSIVE HEARING TEST: CPT | Performed by: AUDIOLOGIST

## 2024-07-17 PROCEDURE — 69210 REMOVE IMPACTED EAR WAX UNI: CPT | Performed by: OTOLARYNGOLOGY

## 2024-07-17 PROCEDURE — 92567 TYMPANOMETRY: CPT | Performed by: AUDIOLOGIST

## 2024-07-17 NOTE — NURSING NOTE
Hamilton Hastings's chief complaint for this visit includes:  Chief Complaint   Patient presents with    Consult     Bilateral ear cleaning. No concerns with hearing.      [facility]    Referring Provider:  Referred Self, MD  No address on file    There were no vitals taken for this visit.      Tu Valentin, EMT  July 17, 2024

## 2024-07-17 NOTE — PROGRESS NOTES
Ear wax removal: The patient was seen in the room. An informed consent was obtained from the patient. His ears were evaluated under the microscope. Right ear canal was blocked 70% with ear wax that was suctioned and removed with an alligator. The left ear canal was blocked 100% with ear wax that was suctioned with a 7 tip. He tolerated the procedure well and left the room no complications.  F/up with a hearing test.

## 2024-07-17 NOTE — LETTER
7/17/2024      Hamilton Hastings  [address] [address] [address]      Dear Colleague,    Thank you for referring your patient, Hamilton Hastings, to the [facility]. Please see a copy of my visit note below.    Ear wax removal: The patient was seen in the room. An informed consent was obtained from the patient. His ears were evaluated under the microscope. Right ear canal was blocked 70% with ear wax that was suctioned and removed with an alligator. The left ear canal was blocked 100% with ear wax that was suctioned with a 7 tip. He tolerated the procedure well and left the room no complications.  F/up with a hearing test.      Again, thank you for allowing me to participate in the care of your patient.        Sincerely,        Armando Fortune MD

## 2024-07-17 NOTE — PROGRESS NOTES
AUDIOLOGY REPORT    SUMMARY: Audiology visit completed. See audiogram for results.    RECOMMENDATIONS: Follow-up with ENT.    Murphy Vines  Doctor of Audiology  [place] License # [id number]    
Detail Level: Zone

## 2024-12-30 ENCOUNTER — TELEPHONE (OUTPATIENT)
Dept: INTERNAL MEDICINE | Facility: CLINIC | Age: 29
End: 2024-12-30
Payer: MEDICARE

## 2024-12-30 NOTE — TELEPHONE ENCOUNTER
Forms/Letter Request    Type of form/letter: OTHER: [facility] Medical- med list       Do we have the form/letter: Yes: place in provider's in box    Who is the form from? Insurance comp    Where did/will the form come from? form was faxed in    When is form/letter needed by: ASAP    How would you like the form/letter returned: Fax : [phone]

## 2025-01-06 ENCOUNTER — MEDICAL CORRESPONDENCE (OUTPATIENT)
Dept: HEALTH INFORMATION MANAGEMENT | Facility: CLINIC | Age: 30
End: 2025-01-06
Payer: MEDICARE

## 2025-02-21 ENCOUNTER — MEDICAL CORRESPONDENCE (OUTPATIENT)
Dept: HEALTH INFORMATION MANAGEMENT | Facility: CLINIC | Age: 30
End: 2025-02-21
Payer: MEDICARE

## 2025-02-24 ENCOUNTER — TELEPHONE (OUTPATIENT)
Dept: INTERNAL MEDICINE | Facility: CLINIC | Age: 30
End: 2025-02-24
Payer: MEDICARE

## 2025-02-24 NOTE — TELEPHONE ENCOUNTER
FYI - Status Update    Who is Calling: [facility] called in and wanted to inform the provider that an autism and IQ & Neurological testing was requested and they can not accept external referrals.       Does caller want a call/response back: No If you need to follow up for any reason you can call at [phone]

## 2025-02-25 NOTE — TELEPHONE ENCOUNTER
Then I am not sure why a request was sent to me requesting orders for assessments.     I will close the encounter and review anything sent to me.

## 2025-03-18 ENCOUNTER — TELEPHONE (OUTPATIENT)
Dept: INTERNAL MEDICINE | Facility: CLINIC | Age: 30
End: 2025-03-18
Payer: MEDICARE

## 2025-03-18 DIAGNOSIS — S06.9X0D TRAUMATIC BRAIN INJURY, WITHOUT LOSS OF CONSCIOUSNESS, SUBSEQUENT ENCOUNTER: Primary | ICD-10-CM

## 2025-03-18 DIAGNOSIS — F84.0 AUTISM: ICD-10-CM

## 2025-03-18 NOTE — TELEPHONE ENCOUNTER
To Matthew Gupta, Pts manager at [facility] called regarding referral for psychological testing. See telephone encounter from 2/19/2025. Per [facility] said they are unable to see this patient. Please advise on next step. Should referral be sent elsewhere or other recommendations?    Venita Ocasio RN

## 2025-03-19 ENCOUNTER — PATIENT OUTREACH (OUTPATIENT)
Dept: CARE COORDINATION | Facility: CLINIC | Age: 30
End: 2025-03-19
Payer: MEDICARE

## 2025-03-19 NOTE — TELEPHONE ENCOUNTER
What is the reason why [facility] cannot see this patient?   Typically they have worked with almost all insurances.     The whole referral for psychological testing was very confusing when we dealt with it last month.  See the messages from last monht regarding this topic.     He needs to be under regular psychiatric care.   I am not going to be his psychiatrist.     I will defer any testing and treatment recommendations to his psychiatric providers.  I can provide referrals as needed, but only with very specific instructions as to what is needed.      I will have the care coordinators help get him set up with a regular psychiatric care if [facility] cannot see him.       Close encounter when done.

## 2025-03-19 NOTE — Clinical Note
Oliver Goldberg, Phone Assessment is scheduled for 3/25/25 at 2:00pm. Please call patient's mother Soraida at [phone]- co- guardian. Please see telephone encounter 3/18/25 & 2/24/25 Thank you. Helena

## 2025-03-19 NOTE — TELEPHONE ENCOUNTER
CC order placed by PCP. They will contact patient/group home staff once referral is processed.     Katy Gonzalez RN

## 2025-03-19 NOTE — PROGRESS NOTES
Clinic Care Coordination Contact  Community Health Worker Initial Outreach    CHW introduced self, intent of call, and offered the CC program.  Spoke with patient's motherSoraida- co-guardian.    See telephone encounter 3/18/25 & 2/24/25    Reason for Referral:  Mental Wellness (Health) (Mental Illness/Chemical Dependency    Resources of Behavioral Health Services     Treatment Options     Additional Information:  needs help getting new psychiatric clinic, was told [facility] could not see him.        CHW Initial Information Gathering:  Referral Source: PCP  Preferred Hospital:  (No Preference)  Current living arrangement:: I live alone  Type of residence:: Group home  Community Resources: Group Home, Financial/Insurance, County Worker  Supplies Currently Used at Home: None  Equipment Currently Used at Home: none  Informal Support system:: Parent, Other (Group Home)  Transportation means:: Family, Other  CHW Additional Questions  If ED/Hospital discharge, follow-up appointment scheduled as recommended?: N/A  Medication changes made following ED/Hospital discharge?: N/A  MyChart active?: No  Patient agreeable to assistance with activating MyChart?: No    Patient accepts CC: Yes. Patient scheduled for assessment with CASI Goldberg on 3/25/25 at 2:00pm. Patient noted desire to discuss resources for testing, psychiatrist, and CC support. Please call patient's mother, Soraida.     GENET Escobar  Clinic Care Coordination  [facility] Clin[facility]: [facility], [facility], [facility], and [facility]  Phone: [phone]

## 2025-03-25 ENCOUNTER — PATIENT OUTREACH (OUTPATIENT)
Dept: NURSING | Facility: CLINIC | Age: 30
End: 2025-03-25
Payer: MEDICARE

## 2025-03-25 ASSESSMENT — ACTIVITIES OF DAILY LIVING (ADL)
DEPENDENT_IADLS:: CLEANING;COOKING;LAUNDRY;SHOPPING;MEAL PREPARATION;MEDICATION MANAGEMENT;MONEY MANAGEMENT;TRANSPORTATION

## 2025-03-25 NOTE — LETTER
[facility] CARE COORDINATION    March 26, 2025    Hamilton Hastings  [address]  [place] [address]      Dear Hamilton,    I am a clinic care coordinator who works with Tarik Ortega MD with the [facility]. I wanted to thank your mom for spending the time to talk with me.  Below is a description of clinic care coordination and how I can further assist you.       The clinic care coordination team is made up of a registered nurse, , financial resource worker and community health worker who understand the health care system. The goal of clinic care coordination is to help you manage your health and improve access to the health care system. Our team works alongside your provider to assist you in determining your health and social needs. We can help you obtain health care and community resources, providing you with necessary information and education. We can work with you through any barriers and develop a care plan that helps coordinate and strengthen the communication between you and your care team.  Our services are voluntary and are offered without charge to you personally.    Please feel free to contact me with any questions or concerns regarding care coordination and what we can offer.      We are focused on providing you with the highest-quality healthcare experience possible.    Sincerely,     BILLIE Zepeda   Care Coordination Team  [phone]      Enclosed: I have enclosed a copy of the Patient Centered Plan of Care. This has helpful information and goals that we have talked about. Please keep this in an easy to access place to use as needed.

## 2025-03-25 NOTE — LETTER
[facility]  Patient Centered Plan of Care  About Me:        Patient Name:  Hamilton Hastings    YOB: 1995  Age:         29 year old   Yanet MRN:    [id number] Telephone Information:  Home Phone [phone]   Mobile [phone]   Mobile Not on file.       Address:  [address] [address] Email address:  No e-mail address on record      Emergency Contact(s)    Name Relationship Lgl Grd Work Phone Home Phone Mobile Phone   1. ABNER HASTINGS* Mother   [phone]    2. RICK HERNANDEZ Other   [phone]    3. ABNER HASTINGS* Other    [phone]   4. JESÚS CONTI* Father    [phone]           Primary language:  English     needed? No   [employer] [employer]:  [phone] op. 1  Other communication barriers:No data recorded  Preferred Method of Communication:  Phone  Current living arrangement: I live alone    Mobility Status/ Medical Equipment: Independent        Health Maintenance  Health Maintenance Reviewed: No data recorded  Health Maintenance Due   Topic Date Due    HEPATITIS B IMMUNIZATION (1 of 3 - 19+ 3-dose series) Never done    PHQ-9  07/29/2024    INFLUENZA VACCINE (1) 09/01/2024    COVID-19 Vaccine (4 - 2024-25 season) 09/01/2024    MEDICARE ANNUAL WELLNESS VISIT  01/29/2025        My Access Plan  Medical Emergency 911   Primary Clinic Line [facility]* - [phone]   24 Hour Appointment Line [phone] or  [phone]) (toll-free)   24 Hour Nurse Line [phone] (toll-free)   Preferred Urgent Care No data recorded   Preferred Hospital -- (No Preference)     Preferred Pharmacy [facility] - [place], [place] - [address]     Behavioral Health Crisis Line The National Suicide Prevention Lifeline at 1-760.935.4199 or Text/Call 548           My Care Team Members  Patient Care Team         Relationship Specialty Notifications Start End    Tarik Ortega MD  PCP - General Internal Medicine  12/9/24     Phone: [phone] Pager: [phone] Fax: [phone]        [address] [place] [address]    Gunnar Espino MD Assigned PCP   2/23/24     Phone: [phone] Fax: [phone]         [address] [address]    Armando Fortune MD MD Otolaryngology  5/29/24     Phone: [phone] Fax: [phone]         [address] [address]    Souleymane Ocasio AuD  Audiology  6/10/24     Phone: [phone] Fax: [phone]         [address] [place] [address]    Armando Fortune MD Assigned Surgical Provider   7/23/24     Phone: [phone] Fax: [phone]         [address] [place] [address]    Ashlyn Capps, BILLIE Lead Care Coordinator Primary Care - CC Admissions 3/19/25     Phone: [phone]                     My Care Plans  Self Management and Treatment Plan    Care Plan  Care Plan: Developmental/Behavioral       Problem: Lacking Appropriate Services and Supports       Goal: Establish appropriate developmental/behavioral services and supports       Start Date: 3/25/2025 Expected End Date: 4/30/2025    Priority: High    Note:     Barriers: Wait lists and finding someone to to Neuropsychological assessment  for Social Security Disability  re certification.    Strengths: Parent and Group Home support  Patient expressed understanding of goal: Yes (mom/guardian.)  Action steps to achieve this goal:  1. Mom will clarify if [facility] received the PCP referral requested and faxed back.  2. Mom will clarify if a psychiatrist needs to do the Neuropsychological assessment.  3. Mom will let [facility] know if she runs in to any barriers with this goal.                                 Action Plans on File:            Depression          Advance Care Plans/Directives:   Advanced Care Plan/Directives on file: Yes    Status of Document(s): No data recorded  Advanced Care Plan/Directives  Type: No data recorded         My Medical and Care Information  Problem List   Patient Active Problem List   Diagnosis    Autism    Traumatic brain injury, without loss of consciousness, subsequent encounter    Episodic mood disorder    Mild episode of recurrent major depressive disorder    Overweight (BMI 25.0-29.9)      Current Medications:  Please refer to the most recent medication list provided to you by your medical team and reach out to your provider with any questions or to make any corrections.    Care Coordination Start Date: 3/19/2025   Frequency of Care Coordination: No data recorded   Form Last Updated: 03/26/2025

## 2025-03-26 NOTE — PROGRESS NOTES
Clinic Care Coordination Contact  Clinic Care Coordination Contact  OUTREACH    Referral Information:  Referral Source: PCP         Chief Complaint   Patient presents with    Clinic Care Coordination - Initial     Social Security Disability Recertification        Universal Utilization: 27% Admission or ED Risk     Utilization      No Show Count (past year)  1             ED Visits  0             Hospital Admissions  0                    Current as of: 3/25/2025  3:33 PM                Clinical Concerns:  Current Medical Concerns:    Patient Active Problem List   Diagnosis    Autism    Traumatic brain injury, without loss of consciousness, subsequent encounter    Episodic mood disorder    Mild episode of recurrent major depressive disorder    Overweight (BMI 25.0-29.9)        ANGELICA LANCE spoke with mom Soraida. There has been confusion about what is needed to get Patient re certified for Social Security Disability.  ANGELICA LANCE reviewed with Soraida that PCP filled out and faxed a referral form to [facility]. She will call them and the group home to ask if they received the referral. Also - [facility] may no longer be able to do the Neuropsychological assessment. She has also been told that a Psychiatrist needs to do the evaluation. She will check on that as well.       Current Behavioral Concerns: None noted      Education Provided to patient: CC role, can send her a list of places that do Neuropsychological assessment but most evaluators that ANGELICA LANCE aware of are not Psychiatrists. She stated that she already has a list.     Health Maintenance Reviewed:   Health Maintenance Due   Topic Date Due    HEPATITIS B IMMUNIZATION (1 of 3 - 19+ 3-dose series) Never done    PHQ-9  07/29/2024    INFLUENZA VACCINE (1) 09/01/2024    COVID-19 Vaccine (4 - 2024-25 season) 09/01/2024    MEDICARE ANNUAL WELLNESS VISIT  01/29/2025         Medication Management:  Medication review status: Medications reviewed and no changes reported per  patient.             Functional Status:  Dependent IADLs:: Cleaning, Cooking, Laundry, Shopping, Meal Preparation, Medication Management, Money Management, Transportation  Mobility Status: Independent    Living Situation:  Current living arrangement:: I live alone  Type of residence:: Group home    Lifestyle & Psychosocial Needs:    Social Drivers of Health     Food Insecurity: Low Risk  (1/29/2024)    Food Insecurity     Within the past 12 months, did you worry that your food would run out before you got money to buy more?: No     Within the past 12 months, did the food you bought just not last and you didn t have money to get more?: No   Depression: At risk (1/29/2024)    PHQ-2     PHQ-2 Score: 3   Housing Stability: Low Risk  (1/29/2024)    Housing Stability     Do you have housing? : Yes     Are you worried about losing your housing?: No   Tobacco Use: Low Risk  (1/29/2024)    Patient History     Smoking Tobacco Use: Never     Smokeless Tobacco Use: Never     Passive Exposure: Not on file   Financial Resource Strain: Low Risk  (1/29/2024)    Financial Resource Strain     Within the past 12 months, have you or your family members you live with been unable to get utilities (heat, electricity) when it was really needed?: No   Alcohol Use: Not on file   Transportation Needs: Low Risk  (1/29/2024)    Transportation Needs     Within the past 12 months, has lack of transportation kept you from medical appointments, getting your medicines, non-medical meetings or appointments, work, or from getting things that you need?: No   Physical Activity: Not on file   Interpersonal Safety: Low Risk  (1/29/2024)    Interpersonal Safety     Do you feel physically and emotionally safe where you currently live?: Yes     Within the past 12 months, have you been hit, slapped, kicked or otherwise physically hurt by someone?: No     Within the past 12 months, have you been humiliated or emotionally abused in other ways by your partner or  ex-partner?: No   Stress: Not on file   Social Connections: Not on file   Health Literacy: Not on file              Mental health DX:: Yes  Chemical Dependency Status: No Current Concerns  Informal Support system:: Parent, Other (Group Home)             Resources and Interventions:  Current Resources:      Community Resources: Group Home, Financial/Insurance, County Worker  Supplies Currently Used at Home: None  Equipment Currently Used at Home: none  Employment Status: disabled         Advance Care Plan/Directive  Advanced Care Plans/Directives on file:: Yes    Referrals Placed: Mental Health         Care Plan:  Care Plan: Developmental/Behavioral       Problem: Lacking Appropriate Services and Supports       Goal: Establish appropriate developmental/behavioral services and supports       Start Date: 3/25/2025 Expected End Date: 4/30/2025    Priority: High    Note:     Barriers: Wait lists and finding someone to to Neuropsychological assessment  for Social Security Disability  re certification.    Strengths: Parent and Group Home support  Patient expressed understanding of goal: Yes (mom/guardian.)  Action steps to achieve this goal:  1. Mom will clarify if [facility] received the PCP referral requested and faxed back.  2. Mom will clarify if a psychiatrist needs to do the Neuropsychological assessment.  3. Mom will let SW CC know if she runs in to any barriers with this goal.                                 Patient/Caregiver understanding: Pt reports understanding and denies any additional questions or concerns at this times. SW CC engaged in AIDET communication during encounter.        Future Appointments                In 2 weeks Tarik Ortega MD [facility]    In 1 month Armando Fortune MD [facility]    In 1 month Souleymane Ocasio AuD [facility] Audiology [facility]             Plan: Mom will let SW CC know if any additional support is needed.  SW cC will outreach again in 2 -3 weeks.    BILLIE Zepeda   Care Coordination Team  [phone]

## 2025-04-17 ENCOUNTER — PATIENT OUTREACH (OUTPATIENT)
Dept: CARE COORDINATION | Facility: CLINIC | Age: 30
End: 2025-04-17
Payer: MEDICARE

## 2025-04-17 NOTE — PROGRESS NOTES
Clinic Care Coordination Contact  [facility]/Voicemail    Clinical Data: Care Coordinator Outreach    Outreach Documentation Number of Outreach Attempt   4/17/2025   2:46 PM 1       Left message on patient's voicemail with call back information and requested return call.      Plan:  Care Coordinator will try to reach patient again in 10 business days.    BILLIE Zepeda   Care Coordination Team  [phone]

## 2025-05-01 ENCOUNTER — PATIENT OUTREACH (OUTPATIENT)
Dept: CARE COORDINATION | Facility: CLINIC | Age: 30
End: 2025-05-01
Payer: MEDICARE

## 2025-05-01 NOTE — PROGRESS NOTES
Clinic Care Coordination Contact  [facility]/Voicemail    Clinical Data:  Care Coordinator Outreach    Outreach Documentation Number of Outreach Attempt   4/17/2025   2:46 PM 1   5/1/2025  11:17 AM 1       Left message on mother's voicemail with call back information and requested return call.      Plan:   Care Coordinator will try to reach patient again in 10 business days.      BILLIE Fay / for BILLIE Zepeda  [facility]   Care Coordination  [facility]  5/1/2025 11:17 AM

## 2025-08-12 ENCOUNTER — OFFICE VISIT (OUTPATIENT)
Dept: FAMILY MEDICINE | Facility: CLINIC | Age: 30
End: 2025-08-12
Payer: MEDICARE

## 2025-08-12 VITALS
BODY MASS INDEX: 26.42 KG/M2 | RESPIRATION RATE: 16 BRPM | HEIGHT: 74 IN | OXYGEN SATURATION: 95 % | WEIGHT: 205.9 LBS | DIASTOLIC BLOOD PRESSURE: 65 MMHG | SYSTOLIC BLOOD PRESSURE: 102 MMHG | TEMPERATURE: 97.6 F | HEART RATE: 84 BPM

## 2025-08-12 DIAGNOSIS — F33.0 MILD EPISODE OF RECURRENT MAJOR DEPRESSIVE DISORDER: ICD-10-CM

## 2025-08-12 DIAGNOSIS — E66.3 OVERWEIGHT (BMI 25.0-29.9): ICD-10-CM

## 2025-08-12 DIAGNOSIS — Z00.00 ENCOUNTER FOR MEDICARE ANNUAL WELLNESS EXAM: Primary | ICD-10-CM

## 2025-08-12 DIAGNOSIS — F84.0 AUTISM SPECTRUM DISORDER: ICD-10-CM

## 2025-08-12 DIAGNOSIS — S06.9X0D TRAUMATIC BRAIN INJURY, WITHOUT LOSS OF CONSCIOUSNESS, SUBSEQUENT ENCOUNTER: ICD-10-CM

## 2025-08-12 PROCEDURE — 1126F AMNT PAIN NOTED NONE PRSNT: CPT

## 2025-08-12 PROCEDURE — 3078F DIAST BP <80 MM HG: CPT

## 2025-08-12 PROCEDURE — G0439 PPPS, SUBSEQ VISIT: HCPCS

## 2025-08-12 PROCEDURE — 3074F SYST BP LT 130 MM HG: CPT

## 2025-08-12 RX ORDER — CALCIUM POLYCARBOPHIL 625 MG/1
TABLET, FILM COATED ORAL
COMMUNITY
Start: 2025-07-21

## 2025-08-12 SDOH — HEALTH STABILITY: PHYSICAL HEALTH
ON AVERAGE, HOW MANY DAYS PER WEEK DO YOU ENGAGE IN MODERATE TO STRENUOUS EXERCISE (LIKE A BRISK WALK)?: PATIENT DECLINED

## 2025-08-12 ASSESSMENT — ANXIETY QUESTIONNAIRES
GAD7 TOTAL SCORE: 6
2. NOT BEING ABLE TO STOP OR CONTROL WORRYING: NOT AT ALL
7. FEELING AFRAID AS IF SOMETHING AWFUL MIGHT HAPPEN: MORE THAN HALF THE DAYS
GAD7 TOTAL SCORE: 6
3. WORRYING TOO MUCH ABOUT DIFFERENT THINGS: MORE THAN HALF THE DAYS
IF YOU CHECKED OFF ANY PROBLEMS ON THIS QUESTIONNAIRE, HOW DIFFICULT HAVE THESE PROBLEMS MADE IT FOR YOU TO DO YOUR WORK, TAKE CARE OF THINGS AT HOME, OR GET ALONG WITH OTHER PEOPLE: SOMEWHAT DIFFICULT
1. FEELING NERVOUS, ANXIOUS, OR ON EDGE: NOT AT ALL
1. FEELING NERVOUS, ANXIOUS, OR ON EDGE: NOT AT ALL
5. BEING SO RESTLESS THAT IT IS HARD TO SIT STILL: MORE THAN HALF THE DAYS
GAD7 TOTAL SCORE: 6
7. FEELING AFRAID AS IF SOMETHING AWFUL MIGHT HAPPEN: MORE THAN HALF THE DAYS
3. WORRYING TOO MUCH ABOUT DIFFERENT THINGS: MORE THAN HALF THE DAYS
6. BECOMING EASILY ANNOYED OR IRRITABLE: NOT AT ALL
2. NOT BEING ABLE TO STOP OR CONTROL WORRYING: NOT AT ALL
5. BEING SO RESTLESS THAT IT IS HARD TO SIT STILL: MORE THAN HALF THE DAYS
6. BECOMING EASILY ANNOYED OR IRRITABLE: NOT AT ALL
IF YOU CHECKED OFF ANY PROBLEMS ON THIS QUESTIONNAIRE, HOW DIFFICULT HAVE THESE PROBLEMS MADE IT FOR YOU TO DO YOUR WORK, TAKE CARE OF THINGS AT HOME, OR GET ALONG WITH OTHER PEOPLE: SOMEWHAT DIFFICULT

## 2025-08-12 ASSESSMENT — PATIENT HEALTH QUESTIONNAIRE - PHQ9
SUM OF ALL RESPONSES TO PHQ QUESTIONS 1-9: 1
SUM OF ALL RESPONSES TO PHQ QUESTIONS 1-9: 1
5. POOR APPETITE OR OVEREATING: NOT AT ALL
10. IF YOU CHECKED OFF ANY PROBLEMS, HOW DIFFICULT HAVE THESE PROBLEMS MADE IT FOR YOU TO DO YOUR WORK, TAKE CARE OF THINGS AT HOME, OR GET ALONG WITH OTHER PEOPLE: NOT DIFFICULT AT ALL
5. POOR APPETITE OR OVEREATING: NOT AT ALL

## 2025-08-12 ASSESSMENT — PAIN SCALES - GENERAL: PAINLEVEL_OUTOF10: NO PAIN (0)

## 2025-08-12 ASSESSMENT — SOCIAL DETERMINANTS OF HEALTH (SDOH): HOW OFTEN DO YOU GET TOGETHER WITH FRIENDS OR RELATIVES?: MORE THAN THREE TIMES A WEEK
